# Patient Record
Sex: FEMALE | Race: ASIAN | NOT HISPANIC OR LATINO | Employment: UNEMPLOYED | RURAL
[De-identification: names, ages, dates, MRNs, and addresses within clinical notes are randomized per-mention and may not be internally consistent; named-entity substitution may affect disease eponyms.]

---

## 2023-04-04 ENCOUNTER — OFFICE VISIT (OUTPATIENT)
Dept: FAMILY MEDICINE CLINIC | Facility: CLINIC | Age: 53
End: 2023-04-04
Payer: MEDICAID

## 2023-04-04 VITALS
SYSTOLIC BLOOD PRESSURE: 124 MMHG | DIASTOLIC BLOOD PRESSURE: 80 MMHG | BODY MASS INDEX: 32.58 KG/M2 | HEART RATE: 73 BPM | OXYGEN SATURATION: 98 % | HEIGHT: 64 IN | WEIGHT: 190.8 LBS

## 2023-04-04 DIAGNOSIS — Z13.1 SCREENING FOR DIABETES MELLITUS: ICD-10-CM

## 2023-04-04 DIAGNOSIS — Z12.31 ENCOUNTER FOR SCREENING MAMMOGRAM FOR MALIGNANT NEOPLASM OF BREAST: ICD-10-CM

## 2023-04-04 DIAGNOSIS — Z11.59 ENCOUNTER FOR HEPATITIS C SCREENING TEST FOR LOW RISK PATIENT: ICD-10-CM

## 2023-04-04 DIAGNOSIS — G25.81 RESTLESS LEGS: ICD-10-CM

## 2023-04-04 DIAGNOSIS — F33.1 MAJOR DEPRESSIVE DISORDER, RECURRENT EPISODE, MODERATE DEGREE: ICD-10-CM

## 2023-04-04 DIAGNOSIS — Z13.220 SCREENING FOR HYPERLIPIDEMIA: ICD-10-CM

## 2023-04-04 DIAGNOSIS — E03.9 HYPOTHYROIDISM, UNSPECIFIED TYPE: Primary | ICD-10-CM

## 2023-04-04 DIAGNOSIS — F51.01 PRIMARY INSOMNIA: ICD-10-CM

## 2023-04-04 PROCEDURE — 99204 OFFICE O/P NEW MOD 45 MIN: CPT | Performed by: INTERNAL MEDICINE

## 2023-04-04 PROCEDURE — 1160F RVW MEDS BY RX/DR IN RCRD: CPT | Performed by: INTERNAL MEDICINE

## 2023-04-04 PROCEDURE — 36415 COLL VENOUS BLD VENIPUNCTURE: CPT | Performed by: INTERNAL MEDICINE

## 2023-04-04 PROCEDURE — 1159F MED LIST DOCD IN RCRD: CPT | Performed by: INTERNAL MEDICINE

## 2023-04-04 RX ORDER — SERTRALINE HYDROCHLORIDE 100 MG/1
50 TABLET, FILM COATED ORAL TAKE AS DIRECTED
COMMUNITY
End: 2023-04-04 | Stop reason: SDUPTHER

## 2023-04-04 RX ORDER — CLONAZEPAM 0.5 MG/1
0.25 TABLET ORAL DAILY
COMMUNITY
End: 2023-04-04

## 2023-04-04 RX ORDER — LEVOTHYROXINE SODIUM 0.12 MG/1
125 TABLET ORAL
COMMUNITY
End: 2023-04-04

## 2023-04-04 RX ORDER — LEVOTHYROXINE SODIUM 0.1 MG/1
100 TABLET ORAL
COMMUNITY
End: 2023-04-04

## 2023-04-04 RX ORDER — SERTRALINE HYDROCHLORIDE 100 MG/1
50 TABLET, FILM COATED ORAL TAKE AS DIRECTED
Qty: 45 TABLET | Refills: 3 | Status: SHIPPED | OUTPATIENT
Start: 2023-04-04

## 2023-04-04 RX ORDER — LEVOTHYROXINE SODIUM 112 UG/1
112 TABLET ORAL DAILY
Qty: 90 TABLET | Refills: 3 | Status: SHIPPED | OUTPATIENT
Start: 2023-04-04

## 2023-04-04 RX ORDER — TRAZODONE HYDROCHLORIDE 50 MG/1
50 TABLET ORAL NIGHTLY
Qty: 30 TABLET | Refills: 6 | Status: SHIPPED | OUTPATIENT
Start: 2023-04-04

## 2023-04-04 RX ORDER — DESVENLAFAXINE 50 MG/1
50 TABLET, EXTENDED RELEASE ORAL DAILY
Qty: 90 TABLET | Refills: 3 | Status: SHIPPED | OUTPATIENT
Start: 2023-04-04

## 2023-04-04 RX ORDER — DESVENLAFAXINE 100 MG/1
50 TABLET, EXTENDED RELEASE ORAL DAILY
COMMUNITY
End: 2023-04-04

## 2023-04-04 NOTE — PROGRESS NOTES
Office Note     Name: Anita Montano    : 1970     MRN: [id number]     Chief Complaint  Establish Care (Pt is here to establish care today. She is here with son Miles. ), Back Pain (C/o low back pain. ), and Insomnia (Pt complains of insomnia. )    Subjective     History of Present Illness:  Anita Montano is a 52 y.o. female who presents today for Establish Care, recentl moved from [facility]      Hypothyroidism diagnosed about 15 years ago, last labs were done in September    Depression:  Has been on meds for about 1.5 years    Insomnia: was started on clonazepam for sleep around that time,  Does not help much for sleep, feels tired all the time but cannot slept, intitially it worked well and no longer works as well.  Has never tried any other medications for sleep.      Has had lower back pain for one month , no history of injury but she did have some falls a few months ago. Does not recall why or how she fell. Denies numbness or tingling      Is fasting for labs today    Has never had mammogram.    PAP smear was     Had Hysterectomy prior to that. No history of cancer or tumors.    Colonoscopy: None    Review of Systems:   Review of Systems    Past Medical History:   Past Medical History:   Diagnosis Date   • Anxiety    • Depression    • Hypothyroidism        Past Surgical History:   Past Surgical History:   Procedure Laterality Date   • HYSTERECTOMY      Non-cancerous reasons       Family History:   Family History   Problem Relation Age of Onset   • Breast cancer Mother    • Hypertension Mother    • Asthma Father    • Breast cancer Sister    • Cancer Brother        Social History:   Social History     Socioeconomic History   • Marital status:    Tobacco Use   • Smoking status: Never     Passive exposure: Never   • Smokeless tobacco: Never   Vaping Use   • Vaping Use: Never used   Substance and Sexual Activity   • Alcohol use: Not Currently   • Drug use: Defer   • Sexual  "activity: Defer       Immunizations:   There is no immunization history on file for this patient.     Medications:     Current Outpatient Medications:   •  sertraline (ZOLOFT) 100 MG tablet, Take 0.5 tablets by mouth Take As Directed. Take Half Pill 1 qd, Disp: 45 tablet, Rfl: 3  •  desvenlafaxine ER (KHEDEZLA) 100 MG tablet sustained-release 24 hour 24 hr tablet, Take 1/2 tablet by mouth Daily., Disp: 90 tablet, Rfl: 3  •  levothyroxine (SYNTHROID, LEVOTHROID) 112 MCG tablet, Take 1 tablet by mouth Daily., Disp: 90 tablet, Rfl: 3  •  traZODone (DESYREL) 50 MG tablet, Take 1 tablet by mouth Every Night., Disp: 30 tablet, Rfl: 6    Allergies:   No Known Allergies    Objective     Vital Signs  /80   Pulse 73   Ht 162.6 cm (64\")   Wt 86.5 kg (190 lb 12.8 oz)   SpO2 98%   BMI 32.75 kg/m²   Estimated body mass index is 32.75 kg/m² as calculated from the following:    Height as of this encounter: 162.6 cm (64\").    Weight as of this encounter: 86.5 kg (190 lb 12.8 oz).          Physical Exam  Vitals and nursing note reviewed.   Constitutional:       Appearance: Normal appearance.   Cardiovascular:      Rate and Rhythm: Normal rate and regular rhythm.      Heart sounds: No murmur heard.    No friction rub. No gallop.   Pulmonary:      Effort: Pulmonary effort is normal.      Breath sounds: Normal breath sounds. No wheezing, rhonchi or rales.   Musculoskeletal:      Thoracic back: No bony tenderness.      Lumbar back: No bony tenderness.   Neurological:      Mental Status: She is alert.            Procedures     Assessment and Plan     1. Hypothyroidism, unspecified type    - Comprehensive Metabolic Panel; Future  - TSH Rfx On Abnormal To Free T4; Future  - Comprehensive Metabolic Panel  - TSH Rfx On Abnormal To Free T4  - levothyroxine (SYNTHROID, LEVOTHROID) 112 MCG tablet; Take 1 tablet by mouth Daily.  Dispense: 90 tablet; Refill: 3    2. Major depressive disorder, recurrent episode, moderate degree    - CBC " Auto Differential; Future  - Comprehensive Metabolic Panel; Future  - CBC Auto Differential  - Comprehensive Metabolic Panel  - sertraline (ZOLOFT) 100 MG tablet; Take 0.5 tablets by mouth Take As Directed. Take Half Pill 1 qd  Dispense: 45 tablet; Refill: 3  - desvenlafaxine ER (KHEDEZLA) 50 MG tablet sustained-release 24 hour 24 hr tablet; Take 1 tablet by mouth Daily.  Dispense: 90 tablet; Refill: 3    3. Screening for hyperlipidemia    - Lipid Panel; Future  - Lipid Panel    4. Encounter for hepatitis C screening test for low risk patient      5. Screening for diabetes mellitus    - Comprehensive Metabolic Panel; Future  - Hepatitis C Antibody; Future  - Comprehensive Metabolic Panel  - Hepatitis C Antibody    6. Restless legs    - CBC Auto Differential; Future  - Comprehensive Metabolic Panel; Future  - Iron and TIBC; Future  - CBC Auto Differential  - Comprehensive Metabolic Panel  - Iron and TIBC    7. Primary insomnia    - traZODone (DESYREL) 50 MG tablet; Take 1 tablet by mouth Every Night.  Dispense: 30 tablet; Refill: 6    8. Encounter for screening mammogram for malignant neoplasm of breast    - Mammo Screening Bilateral With CAD; Future       Follow Up  Return in about 3 months (around 7/4/2023) for Followup with Dr Quevedo- IN PERSON.    Patient was given instructions and counseling regarding her condition or for health maintenance advice. Please see specific information pulled into the AVS if appropriate.     Maida Quevedo MD  [facility]  [address] [place] [id number] [phone]

## 2023-04-05 LAB
ALBUMIN SERPL-MCNC: 4.3 G/DL (ref 3.8–4.9)
ALBUMIN/GLOB SERPL: 1.7 {RATIO} (ref 1.2–2.2)
ALP SERPL-CCNC: 85 IU/L (ref 44–121)
ALT SERPL-CCNC: 19 IU/L (ref 0–32)
AST SERPL-CCNC: 21 IU/L (ref 0–40)
BASOPHILS # BLD AUTO: 0 X10E3/UL (ref 0–0.2)
BASOPHILS NFR BLD AUTO: 1 %
BILIRUB SERPL-MCNC: 0.3 MG/DL (ref 0–1.2)
BUN SERPL-MCNC: 9 MG/DL (ref 6–24)
BUN/CREAT SERPL: 12 (ref 9–23)
CALCIUM SERPL-MCNC: 9.4 MG/DL (ref 8.7–10.2)
CHLORIDE SERPL-SCNC: 106 MMOL/L (ref 96–106)
CHOLEST SERPL-MCNC: 226 MG/DL (ref 100–199)
CO2 SERPL-SCNC: 25 MMOL/L (ref 20–29)
CREAT SERPL-MCNC: 0.77 MG/DL (ref 0.57–1)
EGFRCR SERPLBLD CKD-EPI 2021: 92 ML/MIN/1.73
EOSINOPHIL # BLD AUTO: 0.1 X10E3/UL (ref 0–0.4)
EOSINOPHIL NFR BLD AUTO: 2 %
ERYTHROCYTE [DISTWIDTH] IN BLOOD BY AUTOMATED COUNT: 14.6 % (ref 11.7–15.4)
GLOBULIN SER CALC-MCNC: 2.5 G/DL (ref 1.5–4.5)
GLUCOSE SERPL-MCNC: 95 MG/DL (ref 70–99)
HCT VFR BLD AUTO: 38.9 % (ref 34–46.6)
HCV IGG SERPL QL IA: NON REACTIVE
HDLC SERPL-MCNC: 80 MG/DL
HGB BLD-MCNC: 13.1 G/DL (ref 11.1–15.9)
IMM GRANULOCYTES # BLD AUTO: 0 X10E3/UL (ref 0–0.1)
IMM GRANULOCYTES NFR BLD AUTO: 0 %
IRON SATN MFR SERPL: 22 % (ref 15–55)
IRON SERPL-MCNC: 79 UG/DL (ref 27–159)
LDLC SERPL CALC-MCNC: 125 MG/DL (ref 0–99)
LYMPHOCYTES # BLD AUTO: 2.1 X10E3/UL (ref 0.7–3.1)
LYMPHOCYTES NFR BLD AUTO: 36 %
MCH RBC QN AUTO: 26.7 PG (ref 26.6–33)
MCHC RBC AUTO-ENTMCNC: 33.7 G/DL (ref 31.5–35.7)
MCV RBC AUTO: 79 FL (ref 79–97)
MONOCYTES # BLD AUTO: 0.4 X10E3/UL (ref 0.1–0.9)
MONOCYTES NFR BLD AUTO: 7 %
NEUTROPHILS # BLD AUTO: 3.3 X10E3/UL (ref 1.4–7)
NEUTROPHILS NFR BLD AUTO: 54 %
PLATELET # BLD AUTO: 286 X10E3/UL (ref 150–450)
POTASSIUM SERPL-SCNC: 4.5 MMOL/L (ref 3.5–5.2)
PROT SERPL-MCNC: 6.8 G/DL (ref 6–8.5)
RBC # BLD AUTO: 4.9 X10E6/UL (ref 3.77–5.28)
SODIUM SERPL-SCNC: 143 MMOL/L (ref 134–144)
TIBC SERPL-MCNC: 363 UG/DL (ref 250–450)
TRIGL SERPL-MCNC: 119 MG/DL (ref 0–149)
TSH SERPL DL<=0.005 MIU/L-ACNC: 0.69 UIU/ML (ref 0.45–4.5)
UIBC SERPL-MCNC: 284 UG/DL (ref 131–425)
VLDLC SERPL CALC-MCNC: 21 MG/DL (ref 5–40)
WBC # BLD AUTO: 6 X10E3/UL (ref 3.4–10.8)

## 2023-04-11 ENCOUNTER — OFFICE VISIT (OUTPATIENT)
Dept: FAMILY MEDICINE CLINIC | Facility: CLINIC | Age: 53
End: 2023-04-11
Payer: MEDICAID

## 2023-04-11 VITALS
HEIGHT: 63 IN | OXYGEN SATURATION: 98 % | TEMPERATURE: 98.1 F | DIASTOLIC BLOOD PRESSURE: 84 MMHG | HEART RATE: 77 BPM | BODY MASS INDEX: 33.66 KG/M2 | SYSTOLIC BLOOD PRESSURE: 126 MMHG | WEIGHT: 190 LBS

## 2023-04-11 DIAGNOSIS — M54.2 NECK PAIN: ICD-10-CM

## 2023-04-11 DIAGNOSIS — M54.50 CHRONIC LOW BACK PAIN WITHOUT SCIATICA, UNSPECIFIED BACK PAIN LATERALITY: Primary | ICD-10-CM

## 2023-04-11 DIAGNOSIS — M25.511 ACUTE PAIN OF RIGHT SHOULDER: ICD-10-CM

## 2023-04-11 DIAGNOSIS — G89.29 CHRONIC LOW BACK PAIN WITHOUT SCIATICA, UNSPECIFIED BACK PAIN LATERALITY: Primary | ICD-10-CM

## 2023-04-11 PROCEDURE — 1159F MED LIST DOCD IN RCRD: CPT | Performed by: INTERNAL MEDICINE

## 2023-04-11 PROCEDURE — 99213 OFFICE O/P EST LOW 20 MIN: CPT | Performed by: INTERNAL MEDICINE

## 2023-04-11 PROCEDURE — 1160F RVW MEDS BY RX/DR IN RCRD: CPT | Performed by: INTERNAL MEDICINE

## 2023-04-11 RX ORDER — CYCLOBENZAPRINE HCL 5 MG
5 TABLET ORAL 2 TIMES DAILY PRN
Qty: 14 TABLET | Refills: 0 | Status: SHIPPED | OUTPATIENT
Start: 2023-04-11

## 2023-04-11 NOTE — PROGRESS NOTES
Office Note     Name: Anita Montano    : 1970     MRN: [id number]     Chief Complaint  Facial Pain and Arm Pain (C/o swelling in the right arm goes all the way down in the hand onset 15 days. )    Subjective     History of Present Illness:  Antia Montano is a 53 y.o. female who presents today for pain and swelling in right arm    Has pain along rgiht side of face radiating to shoulder arm and fingers on the right.     Hast noticed the beins I nthe right side of the arm seem much more prominent than usual, hurts to squeeze or grab o nthat side but the arm and face have not been swollen or discolored    Review of Systems:   Review of Systems    Past Medical History:   Past Medical History:   Diagnosis Date   • Anxiety    • Depression    • Hypothyroidism        Past Surgical History:   Past Surgical History:   Procedure Laterality Date   • HYSTERECTOMY  2010    Non-cancerous reasons       Family History:   Family History   Problem Relation Age of Onset   • Breast cancer Mother    • Hypertension Mother    • Asthma Father    • Breast cancer Sister    • Cancer Brother        Social History:   Social History     Socioeconomic History   • Marital status:    Tobacco Use   • Smoking status: Never     Passive exposure: Never   • Smokeless tobacco: Never   Vaping Use   • Vaping Use: Never used   Substance and Sexual Activity   • Alcohol use: Not Currently   • Drug use: Defer   • Sexual activity: Defer       Immunizations:   There is no immunization history on file for this patient.     Medications:     Current Outpatient Medications:   •  desvenlafaxine ER (KHEDEZLA) 50 MG tablet sustained-release 24 hour 24 hr tablet, Take 1 tablet by mouth Daily., Disp: 90 tablet, Rfl: 3  •  levothyroxine (SYNTHROID, LEVOTHROID) 112 MCG tablet, Take 1 tablet by mouth Daily., Disp: 90 tablet, Rfl: 3  •  sertraline (ZOLOFT) 100 MG tablet, Take 0.5 tablets by mouth Take As Directed. Take Half Pill 1 qd, Disp: 45  "tablet, Rfl: 3  •  traZODone (DESYREL) 50 MG tablet, Take 1 tablet by mouth Every Night., Disp: 30 tablet, Rfl: 6    Allergies:   No Known Allergies    Objective     Vital Signs  /84   Pulse 77   Temp 98.1 °F (36.7 °C)   Ht 160 cm (63\")   Wt 86.2 kg (190 lb)   SpO2 98%   BMI 33.66 kg/m²   Estimated body mass index is 33.66 kg/m² as calculated from the following:    Height as of this encounter: 160 cm (63\").    Weight as of this encounter: 86.2 kg (190 lb).          Physical Exam  Vitals and nursing note reviewed.   Constitutional:       Appearance: Normal appearance.   HENT:      Right Ear: Tympanic membrane normal.      Left Ear: Tympanic membrane normal.   Cardiovascular:      Rate and Rhythm: Normal rate and regular rhythm.      Heart sounds: No murmur heard.    No friction rub. No gallop.   Pulmonary:      Effort: Pulmonary effort is normal.      Breath sounds: Normal breath sounds. No wheezing, rhonchi or rales.   Musculoskeletal:      Cervical back: No deformity or bony tenderness.      Comments: No pitting edema, 5/5 strength of bilateral upper extremities upon flexion and extension at shoulders, elbows and wrists, normal abduction, flexion and extension, mild muscle sparms of right shoulder   Neurological:      Mental Status: She is alert.            Procedures     Assessment and Plan     1. Chronic low back pain without sciatica, unspecified back pain laterality    - XR Spine Cervical 2 or 3 View; Future  - XR Spine Lumbar 2 or 3 View (In Office)  - cyclobenzaprine (FLEXERIL) 5 MG tablet; Take 1 tablet by mouth 2 (Two) Times a Day As Needed for Muscle Spasms.  Dispense: 14 tablet; Refill: 0  - XR Spine Thoracic 2 View    2. Neck pain    - XR Spine Cervical 2 or 3 View; Future  - XR Spine Lumbar 2 or 3 View (In Office)  - cyclobenzaprine (FLEXERIL) 5 MG tablet; Take 1 tablet by mouth 2 (Two) Times a Day As Needed for Muscle Spasms.  Dispense: 14 tablet; Refill: 0  - XR Spine Thoracic 2 View    3. " Acute pain of right shoulder    - XR Spine Cervical 2 or 3 View; Future  - XR Spine Lumbar 2 or 3 View (In Office)  - cyclobenzaprine (FLEXERIL) 5 MG tablet; Take 1 tablet by mouth 2 (Two) Times a Day As Needed for Muscle Spasms.  Dispense: 14 tablet; Refill: 0  - XR Spine Thoracic 2 View    .dig    Follow Up  Return if symptoms worsen or fail to improve.    Patient was given instructions and counseling regarding her condition or for health maintenance advice. Please see specific information pulled into the AVS if appropriate.     MD ISAC [facility]  [address] [facility] [id number] [phone]

## 2023-04-24 ENCOUNTER — OFFICE VISIT (OUTPATIENT)
Dept: FAMILY MEDICINE CLINIC | Facility: CLINIC | Age: 53
End: 2023-04-24
Payer: MEDICAID

## 2023-04-24 VITALS
SYSTOLIC BLOOD PRESSURE: 132 MMHG | TEMPERATURE: 98.1 F | HEART RATE: 93 BPM | DIASTOLIC BLOOD PRESSURE: 84 MMHG | OXYGEN SATURATION: 99 % | BODY MASS INDEX: 33.66 KG/M2 | WEIGHT: 190 LBS

## 2023-04-24 DIAGNOSIS — M25.462 EFFUSION OF BOTH KNEE JOINTS: ICD-10-CM

## 2023-04-24 DIAGNOSIS — M50.30 DEGENERATIVE DISC DISEASE, CERVICAL: ICD-10-CM

## 2023-04-24 DIAGNOSIS — M25.60 JOINT STIFFNESS: ICD-10-CM

## 2023-04-24 DIAGNOSIS — M25.461 EFFUSION OF BOTH KNEE JOINTS: ICD-10-CM

## 2023-04-24 DIAGNOSIS — M51.36 DEGENERATIVE DISC DISEASE, LUMBAR: Primary | ICD-10-CM

## 2023-04-24 DIAGNOSIS — M15.9 PRIMARY OSTEOARTHRITIS INVOLVING MULTIPLE JOINTS: ICD-10-CM

## 2023-04-25 LAB
ANA SER QL: NEGATIVE
CRP SERPL-MCNC: 3 MG/L (ref 0–10)
ERYTHROCYTE [SEDIMENTATION RATE] IN BLOOD BY WESTERGREN METHOD: 18 MM/HR (ref 0–40)
RHEUMATOID FACT SERPL-ACNC: <10 IU/ML
URATE SERPL-MCNC: 4.7 MG/DL (ref 3–7.2)

## 2023-04-26 NOTE — PROGRESS NOTES
Office Note     Name: Anita Montano    : 1970     MRN: [id number]     Chief Complaint  Pain (Pt complains of joint pain today. ) and Earache (C/o right ear ache and facial discomfort. She is here with her . )     Offered professional  translation/ services for this visit but patient and her  declined and opted to have the  provide most of the medical history for this visit    Subjective     History of Present Illness:  Anita Montano is a 53 y.o. female who presents today for worsening shoulder and knee pain.    At last visit did a trial of flexeril to help with shoulder neck and ear pain due to noted muscle rightness in the right sholder but it did not help.     also describes worsening knee pain and swelling over the last several months.  Knees are more stiff and painful,n ever red but noticeably more swollen        Review of Systems:   Review of Systems    Past Medical History:   Past Medical History:   Diagnosis Date   • Anxiety    • Depression    • Hypothyroidism        Past Surgical History:   Past Surgical History:   Procedure Laterality Date   • HYSTERECTOMY  2010    Non-cancerous reasons       Family History:   Family History   Problem Relation Age of Onset   • Breast cancer Mother    • Hypertension Mother    • Asthma Father    • Breast cancer Sister    • Cancer Brother        Social History:   Social History     Socioeconomic History   • Marital status:    Tobacco Use   • Smoking status: Never     Passive exposure: Never   • Smokeless tobacco: Never   Vaping Use   • Vaping Use: Never used   Substance and Sexual Activity   • Alcohol use: Not Currently   • Drug use: Defer   • Sexual activity: Defer       Immunizations:   There is no immunization history on file for this patient.     Medications:     Current Outpatient Medications:   •  cyclobenzaprine (FLEXERIL) 5 MG tablet, Take 1 tablet by mouth 2 (Two) Times a Day As Needed for Muscle  "Spasms., Disp: 14 tablet, Rfl: 0  •  desvenlafaxine ER (KHEDEZLA) 50 MG tablet sustained-release 24 hour 24 hr tablet, Take 1 tablet by mouth Daily., Disp: 90 tablet, Rfl: 3  •  levothyroxine (SYNTHROID, LEVOTHROID) 112 MCG tablet, Take 1 tablet by mouth Daily., Disp: 90 tablet, Rfl: 3  •  sertraline (ZOLOFT) 100 MG tablet, Take 0.5 tablets by mouth Take As Directed. Take Half Pill 1 qd, Disp: 45 tablet, Rfl: 3  •  traZODone (DESYREL) 50 MG tablet, Take 1 tablet by mouth Every Night., Disp: 30 tablet, Rfl: 6  1    Allergies:   No Known Allergies    Objective     Vital Signs  /84   Pulse 93   Temp 98.1 °F (36.7 °C) (Oral)   Wt 86.2 kg (190 lb)   SpO2 99%   BMI 33.66 kg/m²   Estimated body mass index is 33.66 kg/m² as calculated from the following:    Height as of 4/11/23: 160 cm (63\").    Weight as of this encounter: 86.2 kg (190 lb).          Physical Exam  Vitals and nursing note reviewed.   Constitutional:       General: She is not in acute distress.     Appearance: Normal appearance.   Musculoskeletal:      Right shoulder: Tenderness present. Normal range of motion.        Arms:       Right knee: Swelling and effusion present. No erythema. Normal range of motion. No ACL laxity or PCL laxity.      Left knee: Swelling and effusion present. No erythema. Normal range of motion. No ACL laxity or PCL laxity.     Comments: Muscle tightness   Neurological:      Mental Status: She is alert.          Procedures     Assessment and Plan     1. Degenerative disc disease, lumbar  - diclofenac (VOLTAREN) 50 MG EC tablet; Take 1 tablet by mouth 2 (Two) Times a Day As Needed (pain).  Dispense: 60 tablet; Refill: 1  - Ambulatory Referral to Orthopedic Surgery    2. Degenerative disc disease, cervical  - diclofenac (VOLTAREN) 50 MG EC tablet; Take 1 tablet by mouth 2 (Two) Times a Day As Needed (pain).  Dispense: 60 tablet; Refill: 1    3. Primary osteoarthritis involving multiple joints  - diclofenac (VOLTAREN) 50 MG EC " tablet; Take 1 tablet by mouth 2 (Two) Times a Day As Needed (pain).  Dispense: 60 tablet; Refill: 1    4. Effusion of both knee joints  - diclofenac (VOLTAREN) 50 MG EC tablet; Take 1 tablet by mouth 2 (Two) Times a Day As Needed (pain).  Dispense: 60 tablet; Refill: 1  - Rheumatoid Factor, Quant; Future  - Uric acid; Future  - C-reactive protein; Future  - Sedimentation Rate; Future  - JAZMIN; Future  - Ambulatory Referral to Orthopedic Surgery  - Rheumatoid Factor, Quant  - Uric acid  - C-reactive protein  - Sedimentation Rate  - JAZMIN    5. Joint stiffness  - diclofenac (VOLTAREN) 50 MG EC tablet; Take 1 tablet by mouth 2 (Two) Times a Day As Needed (pain).  Dispense: 60 tablet; Refill: 1  - Rheumatoid Factor, Quant; Future  - Uric acid; Future  - C-reactive protein; Future  - Sedimentation Rate; Future  - JAZMIN; Future  - Rheumatoid Factor, Quant  - Uric acid  - C-reactive protein  - Sedimentation Rate  - JAZMIN     Offered professional  translation/ services for this visit but patient and her  declined and opted to have the  provide most of the medical history for this visit    Follow Up  No follow-ups on file.    Patient was given instructions and counseling regarding her condition or for health maintenance advice. Please see specific information pulled into the AVS if appropriate.     MD SELENE [facility]  [address] [place] [id number] [phone]

## 2023-05-01 ENCOUNTER — TELEPHONE (OUTPATIENT)
Dept: ORTHOPEDIC SURGERY | Facility: CLINIC | Age: 53
End: 2023-05-01

## 2023-05-01 NOTE — TELEPHONE ENCOUNTER
Caller: Anita Montano    Relationship to patient: SELF     Best call back number: [phone]    Chief complaint:  BILATERAL KNEE    Type of visit: NEW PT    Additional notes: PT NEEDS Vianey  FOR 5-15-23 APPT

## 2023-05-15 ENCOUNTER — OFFICE VISIT (OUTPATIENT)
Dept: ORTHOPEDIC SURGERY | Facility: CLINIC | Age: 53
End: 2023-05-15
Payer: MEDICAID

## 2023-05-15 VITALS
HEIGHT: 64 IN | WEIGHT: 188.8 LBS | SYSTOLIC BLOOD PRESSURE: 122 MMHG | BODY MASS INDEX: 32.23 KG/M2 | DIASTOLIC BLOOD PRESSURE: 76 MMHG

## 2023-05-15 DIAGNOSIS — M25.511 RIGHT SHOULDER PAIN, UNSPECIFIED CHRONICITY: Primary | ICD-10-CM

## 2023-05-15 PROCEDURE — 99203 OFFICE O/P NEW LOW 30 MIN: CPT | Performed by: ORTHOPAEDIC SURGERY

## 2023-05-15 PROCEDURE — 1159F MED LIST DOCD IN RCRD: CPT | Performed by: ORTHOPAEDIC SURGERY

## 2023-05-15 PROCEDURE — 1160F RVW MEDS BY RX/DR IN RCRD: CPT | Performed by: ORTHOPAEDIC SURGERY

## 2023-05-15 RX ORDER — DESVENLAFAXINE SUCCINATE 50 MG/1
1 TABLET, EXTENDED RELEASE ORAL DAILY
COMMUNITY
Start: 2023-04-05 | End: 2023-05-15

## 2023-05-15 NOTE — PROGRESS NOTES
[facility]opa[facility] Surgery Clinic Note        Subjective     Pain and Initial Evaluation of the Right Shoulder      HPI    Anita Montano is a 53 y.o. female.  She complains of neck pain radiating down to the right shoulder down to the right thumb.  She had it for a month.  She has tried diclofenac.    Past Medical History:   Diagnosis Date   • Anxiety    • Depression    • Hypothyroidism       Past Surgical History:   Procedure Laterality Date   • HYSTERECTOMY  2010    Non-cancerous reasons      Family History   Problem Relation Age of Onset   • Breast cancer Mother    • Hypertension Mother    • Asthma Father    • Breast cancer Sister    • Cancer Brother      Social History     Socioeconomic History   • Marital status:    Tobacco Use   • Smoking status: Never     Passive exposure: Never   • Smokeless tobacco: Never   Vaping Use   • Vaping Use: Never used   Substance and Sexual Activity   • Alcohol use: Not Currently   • Drug use: Defer   • Sexual activity: Defer      Current Outpatient Medications on File Prior to Visit   Medication Sig Dispense Refill   • cyclobenzaprine (FLEXERIL) 5 MG tablet Take 1 tablet by mouth 2 (Two) Times a Day As Needed for Muscle Spasms. 14 tablet 0   • desvenlafaxine ER (KHEDEZLA) 50 MG tablet sustained-release 24 hour 24 hr tablet Take 1 tablet by mouth Daily. 90 tablet 3   • diclofenac (VOLTAREN) 50 MG EC tablet Take 1 tablet by mouth 2 (Two) Times a Day As Needed (pain). 60 tablet 1   • levothyroxine (SYNTHROID, LEVOTHROID) 112 MCG tablet Take 1 tablet by mouth Daily. 90 tablet 3   • sertraline (ZOLOFT) 100 MG tablet Take 0.5 tablets by mouth Take As Directed. Take Half Pill 1 qd 45 tablet 3   • traZODone (DESYREL) 50 MG tablet Take 1 tablet by mouth Every Night. 30 tablet 6   • [DISCONTINUED] desvenlafaxine (PRISTIQ) 50 MG 24 hr tablet Take 1 tablet by mouth Daily.       No current facility-administered medications on file prior to visit.      No Known Allergies  "      Review of Systems   Constitutional: Negative.    HENT: Negative.    Eyes: Negative.    Respiratory: Negative.    Cardiovascular: Negative.    Gastrointestinal: Negative.    Endocrine: Negative.    Genitourinary: Negative.    Musculoskeletal: Positive for arthralgias.   Skin: Negative.    Allergic/Immunologic: Negative.    Neurological: Negative.    Hematological: Negative.    Psychiatric/Behavioral: Negative.         I reviewed the patient's chief complaint, history of present illness, review of systems, past medical history, surgical history, family history, social history, medications and allergy list.        Objective      Physical Exam  /76   Ht 161.3 cm (63.5\")   Wt 85.6 kg (188 lb 12.8 oz)   BMI 32.92 kg/m²     Body mass index is 32.92 kg/m².    General  Mental Status - alert  General Appearance - cooperative, well groomed, not in acute distress  Orientation - Oriented X3  Build & Nutrition - well developed and well nourished  Posture - normal posture  Gait - normal gait       Ortho Exam  Right shoulder full motion full-strength with positive Spurling's.  Mildly positive impingement.    Imaging/Studies  Imaging Results (Last 24 Hours)     Procedure Component Value Units Date/Time    XR Shoulder 2+ View Right [[id number]] Resulted: 05/15/23 1319     Updated: 05/15/23 1319    Narrative:      Right Shoulder X-Ray  Indication: Pain  AP, scapular Y, and axillary lateral views    Findings:  No fracture  No bony lesion  Normal soft tissues  Normal joint spaces    No prior studies were available for comparison.          I viewed her cervical x-rays from April which showed degenerative disc disease at C4-5 especially    Assessment    Assessment:  1. Right shoulder pain, unspecified chronicity        Plan:  Continue over-the-counter medication as needed for discomfort  She has cervical radiculopathy.  Ordered physical therapy.  She will do that in [place] and follow-up in 1 month.  I offered her " cortisone injection for the shoulder and referral to a possible specialist for neck injection but she does not want any shots yet        Kyrie Romero MD  05/15/23  13:26 EDT      Dictated Utilizing Dragon Dictation.

## 2023-05-22 ENCOUNTER — TELEPHONE (OUTPATIENT)
Dept: FAMILY MEDICINE CLINIC | Facility: CLINIC | Age: 53
End: 2023-05-22
Payer: MEDICAID

## 2023-05-22 DIAGNOSIS — R92.8 ABNORMAL MAMMOGRAM OF BOTH BREASTS: Primary | ICD-10-CM

## 2023-05-22 NOTE — TELEPHONE ENCOUNTER
Pt  is asking for a call about his wife next step from a mammogram. He said she had the mammogram done in [place]. He is asking what to do for a next step for a mammogram. Says son dropped off letter, Marci took it back there.

## 2023-05-23 NOTE — TELEPHONE ENCOUNTER
I have placed the additional orders but I would like patient to keep appt tomorrow to discuss in detail. We can do the additional mamogram and US at [facility], but in the future I strongly encourage her to get her mammograms done only through [facility] because [facility] has been very difficult to work with in terms of getting tests scheduled and getting results back in a timely manner

## 2023-05-23 NOTE — TELEPHONE ENCOUNTER
No answer and no voicemail. Dr Quevedo said that we need additional views from mammogram such as ultra sound. We are very sorry  for not contacting her about the mammogram we actually had to call and get the report faxed because they still had not sent this over.     We will get that set up and contact them wife info.     Also it looks like she has appt tomorrow I am sending to Dr Quevedo so that she can put her orders in!

## 2023-05-24 ENCOUNTER — OFFICE VISIT (OUTPATIENT)
Dept: FAMILY MEDICINE CLINIC | Facility: CLINIC | Age: 53
End: 2023-05-24
Payer: MEDICAID

## 2023-05-24 VITALS
SYSTOLIC BLOOD PRESSURE: 122 MMHG | DIASTOLIC BLOOD PRESSURE: 82 MMHG | WEIGHT: 193.8 LBS | OXYGEN SATURATION: 98 % | HEART RATE: 89 BPM | HEIGHT: 63 IN | BODY MASS INDEX: 34.34 KG/M2

## 2023-05-24 DIAGNOSIS — R92.8 ABNORMAL MAMMOGRAM OF BOTH BREASTS: Primary | ICD-10-CM

## 2023-05-24 PROCEDURE — 1159F MED LIST DOCD IN RCRD: CPT | Performed by: INTERNAL MEDICINE

## 2023-05-24 PROCEDURE — 99213 OFFICE O/P EST LOW 20 MIN: CPT | Performed by: INTERNAL MEDICINE

## 2023-05-24 PROCEDURE — 1160F RVW MEDS BY RX/DR IN RCRD: CPT | Performed by: INTERNAL MEDICINE

## 2023-05-24 NOTE — PROGRESS NOTES
Office Note     Name: Anita Montano    : 1970     MRN: [id number]     Chief Complaint  discuss mammogram  (Pt is here to discuss mammogram results. She is here with son. )    Subjective     History of Present Illness:  Anita Montano is a 53 y.o. female who presents today for follow-up on recent mammogram results.  Patient called the office earlier this week because they got a letter in the mail stating the mammogram results were abnormal.  However at that time [facility] still have not sent us any of the results we had to manually obtain them.      Results were as follows:  Right lower inner breast 4 cm from the nipple was an 8 mm focal asymmetry  Left lateral breast 2 cm from the nipple was a 5 mm focal asymmetry  Radiology recommended bilateral full ML in the CC/MLO spot compression views plus possible ultrasound, as well as possible breast MRI depending on her lifetime risk of breast cancer.      Age at first period: 12,   Age of first childbirth 20  2 relatives with breast cancer    Mom had unilateral breast cancer, unsure of age but she survived and is now 75   Sister had breast cancer at age 22-23, was discovered while pregnant and was unable to undergo treatments, did    not survive        Review of Systems:   Review of Systems    Past Medical History:   Past Medical History:   Diagnosis Date   • Anxiety    • Depression    • Hypothyroidism        Past Surgical History:   Past Surgical History:   Procedure Laterality Date   • HYSTERECTOMY  2010    Non-cancerous reasons       Family History:   Family History   Problem Relation Age of Onset   • Breast cancer Mother    • Hypertension Mother    • Asthma Father    • Breast cancer Sister    • Cancer Brother        Social History:   Social History     Socioeconomic History   • Marital status:    Tobacco Use   • Smoking status: Never     Passive exposure: Never   • Smokeless tobacco: Never   Vaping Use   • Vaping  "Use: Never used   Substance and Sexual Activity   • Alcohol use: Not Currently   • Drug use: Defer   • Sexual activity: Defer       Immunizations:   There is no immunization history on file for this patient.     Medications:     Current Outpatient Medications:   •  cyclobenzaprine (FLEXERIL) 5 MG tablet, Take 1 tablet by mouth 2 (Two) Times a Day As Needed for Muscle Spasms., Disp: 14 tablet, Rfl: 0  •  desvenlafaxine ER (KHEDEZLA) 50 MG tablet sustained-release 24 hour 24 hr tablet, Take 1 tablet by mouth Daily., Disp: 90 tablet, Rfl: 3  •  levothyroxine (SYNTHROID, LEVOTHROID) 112 MCG tablet, Take 1 tablet by mouth Daily., Disp: 90 tablet, Rfl: 3  •  sertraline (ZOLOFT) 100 MG tablet, Take 0.5 tablets by mouth Take As Directed. Take Half Pill 1 qd, Disp: 45 tablet, Rfl: 3  •  traZODone (DESYREL) 50 MG tablet, Take 1 tablet by mouth Every Night., Disp: 30 tablet, Rfl: 6    Allergies:   No Known Allergies    Objective     Vital Signs  /82   Pulse 89   Ht 160 cm (63\")   Wt 87.9 kg (193 lb 12.8 oz)   SpO2 98%   BMI 34.33 kg/m²   Estimated body mass index is 34.33 kg/m² as calculated from the following:    Height as of this encounter: 160 cm (63\").    Weight as of this encounter: 87.9 kg (193 lb 12.8 oz).          Physical Exam  Exam conducted with a chaperone present (Varsha Brink).   Chest:   Breasts:     Right: Tenderness present. No bleeding, inverted nipple, mass, nipple discharge or skin change.      Left: Skin change (single isolated imm dark pigmented skin dimple in the upper chest with no surrounding erythema) and tenderness present. No bleeding, inverted nipple, mass or nipple discharge.          Comments: Tender diffusely thoughout but no palpable mass.  Lymphadenopathy:      Upper Body:      Right upper body: No supraclavicular or axillary adenopathy.      Left upper body: No supraclavicular or axillary adenopathy.            Procedures     Assessment and Plan     1. Abnormal mammogram of both " breasts  Mammogram and US has been ordered.          Lilo Risk Assessment Calculator    The results below are based on the MAUREEN risk assessment model.   These results display your 10-year risk and your  lifetime risk compared to the [place] population average.     Personal 10-Year Risk 8.10 %  Population  10-Year Risk 3.00 %  Personal Lifetime Risk  21.2 %  Population Lifetime Risk 8.40 %    Various versions of the Shelby risk model put her lifetime risk but her Lifetime Risk between 5.5 and 16.1% depending on ethnicity  used    I spent 28 minutes caring for this patient on this date of service. This time includes time spent by me in the following activities: preparing for the visit, reviewing tests, obtaining and/or reviewing a separately obtained history, counseling and educating the patient/family/caregiver and documenting information in the medical record.    Follow Up  Return if symptoms worsen or fail to improve.    Patient was given instructions and counseling regarding her condition or for health maintenance advice. Please see specific information pulled into the AVS if appropriate.     MD S[facility]  [address] [place] [id number] [phone]

## 2023-05-31 ENCOUNTER — TELEPHONE (OUTPATIENT)
Dept: FAMILY MEDICINE CLINIC | Facility: CLINIC | Age: 53
End: 2023-05-31

## 2023-05-31 ENCOUNTER — PATIENT MESSAGE (OUTPATIENT)
Dept: FAMILY MEDICINE CLINIC | Facility: CLINIC | Age: 53
End: 2023-05-31

## 2023-05-31 NOTE — TELEPHONE ENCOUNTER
Caller: Miles Montano    Relationship: Emergency Contact    Best call back number: [phone]    What orders are you requesting (i.e. lab or imaging): MAMMOGRAM AND ULTRASOUND    In what timeframe would the patient need to come in: ASAP    Where will you receive your lab/imaging services: [facility]    Additional notes: CALLER IS REQUESTING FOR ORDERS TO BE PLACED    CALLER WOULD LIKE A CALL ABOUT THE ORDERS

## 2023-06-01 NOTE — TELEPHONE ENCOUNTER
Spoke with damon, [place] has been [place] [facility] Notified patient in her mychart message

## 2023-06-19 ENCOUNTER — OFFICE VISIT (OUTPATIENT)
Dept: ORTHOPEDIC SURGERY | Facility: CLINIC | Age: 53
End: 2023-06-19
Payer: MEDICAID

## 2023-06-19 VITALS
HEIGHT: 64 IN | SYSTOLIC BLOOD PRESSURE: 144 MMHG | WEIGHT: 190 LBS | BODY MASS INDEX: 32.44 KG/M2 | DIASTOLIC BLOOD PRESSURE: 90 MMHG

## 2023-06-19 DIAGNOSIS — M25.561 PAIN IN BOTH KNEES, UNSPECIFIED CHRONICITY: ICD-10-CM

## 2023-06-19 DIAGNOSIS — M54.12 CERVICAL RADICULOPATHY: ICD-10-CM

## 2023-06-19 DIAGNOSIS — M25.511 RIGHT SHOULDER PAIN, UNSPECIFIED CHRONICITY: Primary | ICD-10-CM

## 2023-06-19 DIAGNOSIS — M25.562 PAIN IN BOTH KNEES, UNSPECIFIED CHRONICITY: ICD-10-CM

## 2023-06-19 PROCEDURE — 99214 OFFICE O/P EST MOD 30 MIN: CPT | Performed by: ORTHOPAEDIC SURGERY

## 2023-06-19 RX ORDER — NAPROXEN 500 MG/1
500 TABLET ORAL 2 TIMES DAILY
Qty: 60 TABLET | Refills: 0 | Status: SHIPPED | OUTPATIENT
Start: 2023-06-19

## 2023-06-19 NOTE — PROGRESS NOTES
"    [facility] Note        Subjective     CC: Follow-up (1.)1 month follow up - Right shoulder pain/2.) New Problem Bilateral Knee Pain R>L)      RICKY Montano is a 53 y.o. female.  She is here today about bilateral knee pain.  She has pain in the neck radiating down the right shoulder to the right thumb.  She has had it for a month.  She did not go to physical therapy.  She was expecting a phone call from them.  She has an online  today.  She is here with her .  Communication is difficult.  ROS:    Constiutional:Pt denies fever, chills, nausea, or vomiting.  MSK:as above        Objective      Past Medical History  Past Medical History:   Diagnosis Date    Anxiety     Depression     Hypothyroidism      Social History     Socioeconomic History    Marital status:    Tobacco Use    Smoking status: Never     Passive exposure: Never    Smokeless tobacco: Never   Vaping Use    Vaping Use: Never used   Substance and Sexual Activity    Alcohol use: Not Currently    Drug use: Defer    Sexual activity: Defer          Physical Exam  /90   Ht 161.3 cm (63.5\")   Wt 86.2 kg (190 lb)   BMI 33.13 kg/m²     Body mass index is 33.13 kg/m².    Patient is well nourished and well developed.        Ortho Exam  No change in right shoulder exam.  Positive impingement.  Grossly motor sensor intact.  Tender trapezius.  Bilateral knees tender patella tendons.  Minimal medial joint tenderness.  Stable ligaments.  Normal gait    Imaging/Labs/EMG Reviewed:  Imaging Results (Last 24 Hours)       Procedure Component Value Units Date/Time    XR Knee 4+ View Bilateral [[id number]] Resulted: 06/19/23 1431     Updated: 06/19/23 1431    Narrative:      Bilateral Knee X-Rays  Indication: Pain    Upright AP, Lateral, skiers and Sunrise views of bilateral knees     Findings: Medial compartment joint space narrowing  No fracture  No bony lesion  Normal soft tissues  No prior studies were " available for comparison.              Assessment    Assessment:  1. Right shoulder pain, unspecified chronicity    2. Pain in both knees, unspecified chronicity    3. Cervical radiculopathy        Plan:  I ordered Naprosyn for her shoulder and neck and knees.  I have ordered physical therapy for shoulder and knees.  I will refer to spine surgery based upon her cervical radicular symptoms.  She does not want a cortisone injection      Kyrie Romero MD  06/19/23  14:36 EDT      Dictated Utilizing Dragon Dictation.

## 2023-08-12 ENCOUNTER — HOSPITAL ENCOUNTER (OUTPATIENT)
Dept: MRI IMAGING | Facility: HOSPITAL | Age: 53
Discharge: HOME | End: 2023-08-12
Admitting: PHYSICIAN ASSISTANT
Payer: MEDICAID

## 2023-08-12 DIAGNOSIS — M54.12 CERVICAL RADICULOPATHY: ICD-10-CM

## 2023-08-12 PROCEDURE — 72141 MRI NECK SPINE W/O DYE: CPT

## 2023-08-17 DIAGNOSIS — F51.01 PRIMARY INSOMNIA: ICD-10-CM

## 2023-08-17 DIAGNOSIS — F33.1 MAJOR DEPRESSIVE DISORDER, RECURRENT EPISODE, MODERATE DEGREE: ICD-10-CM

## 2023-08-18 ENCOUNTER — TELEPHONE (OUTPATIENT)
Dept: ORTHOPEDIC SURGERY | Facility: CLINIC | Age: 53
End: 2023-08-18
Payer: MEDICAID

## 2023-08-18 DIAGNOSIS — M54.12 CERVICAL RADICULOPATHY: ICD-10-CM

## 2023-08-18 DIAGNOSIS — M25.511 RIGHT SHOULDER PAIN, UNSPECIFIED CHRONICITY: Primary | ICD-10-CM

## 2023-08-18 RX ORDER — NAPROXEN 500 MG/1
500 TABLET ORAL 2 TIMES DAILY
Qty: 60 TABLET | Refills: 0 | Status: SHIPPED | OUTPATIENT
Start: 2023-08-18

## 2023-08-24 RX ORDER — TRAZODONE HYDROCHLORIDE 50 MG/1
50 TABLET ORAL NIGHTLY
Qty: 30 TABLET | Refills: 6 | Status: SHIPPED | OUTPATIENT
Start: 2023-08-24

## 2023-08-24 RX ORDER — SERTRALINE HYDROCHLORIDE 100 MG/1
50 TABLET, FILM COATED ORAL TAKE AS DIRECTED
Qty: 45 TABLET | Refills: 3 | Status: SHIPPED | OUTPATIENT
Start: 2023-08-24

## 2023-08-24 RX ORDER — DESVENLAFAXINE 50 MG/1
50 TABLET, EXTENDED RELEASE ORAL DAILY
Qty: 90 TABLET | Refills: 3 | Status: SHIPPED | OUTPATIENT
Start: 2023-08-24

## 2024-02-22 ENCOUNTER — OFFICE VISIT (OUTPATIENT)
Dept: FAMILY MEDICINE CLINIC | Facility: CLINIC | Age: 54
End: 2024-02-22
Payer: MEDICAID

## 2024-02-22 VITALS
WEIGHT: 192.9 LBS | BODY MASS INDEX: 32.14 KG/M2 | OXYGEN SATURATION: 97 % | TEMPERATURE: 98.2 F | DIASTOLIC BLOOD PRESSURE: 82 MMHG | HEIGHT: 65 IN | HEART RATE: 70 BPM | SYSTOLIC BLOOD PRESSURE: 124 MMHG

## 2024-02-22 DIAGNOSIS — E03.9 HYPOTHYROIDISM, UNSPECIFIED TYPE: ICD-10-CM

## 2024-02-22 DIAGNOSIS — R32 URINARY INCONTINENCE, UNSPECIFIED TYPE: Primary | ICD-10-CM

## 2024-02-22 DIAGNOSIS — F33.1 MAJOR DEPRESSIVE DISORDER, RECURRENT EPISODE, MODERATE DEGREE: ICD-10-CM

## 2024-02-22 DIAGNOSIS — F51.01 PRIMARY INSOMNIA: ICD-10-CM

## 2024-02-22 LAB
BILIRUB BLD-MCNC: NEGATIVE MG/DL
CLARITY, POC: CLEAR
COLOR UR: YELLOW
EXPIRATION DATE: NORMAL
GLUCOSE UR STRIP-MCNC: NEGATIVE MG/DL
KETONES UR QL: NEGATIVE
LEUKOCYTE EST, POC: NEGATIVE
Lab: NORMAL
NITRITE UR-MCNC: NEGATIVE MG/ML
PH UR: 6 [PH] (ref 5–8)
PROT UR STRIP-MCNC: NEGATIVE MG/DL
RBC # UR STRIP: NEGATIVE /UL
SP GR UR: 1 (ref 1–1.03)
UROBILINOGEN UR QL: NORMAL

## 2024-02-22 RX ORDER — TRAZODONE HYDROCHLORIDE 50 MG/1
25 TABLET ORAL NIGHTLY
Qty: 30 TABLET | Refills: 1 | Status: SHIPPED | OUTPATIENT
Start: 2024-02-22

## 2024-02-22 NOTE — PROGRESS NOTES
Office Note     Name: Anita Montano    : 1970     MRN: [id number]     Chief Complaint  Urinary Frequency (Pt is here for urinary sxs today. She is here with  ) and Sinusitis (Pt complains of discomfort in the right eye. She has concerns with sinusitis after seeing the eye doctor. )    Subjective     History of Present Illness:  Anita Montano is a 53 y.o. female who presents today for:    2-3 weeks ago had urinary intontinence for a fwe weeks  but it resolved about 2-3 weeks ago.  Has pain and pressure in the right eye for severa lweeks possible 2-3 month    Has reduced the doses of her medications because she felt they were cuasing side effects. Has weaned off of the does however still had head ache whe nshe sleeps flat      Review of Systems:   Review of Systems    Past Medical History:   Past Medical History:   Diagnosis Date    Anxiety     Depression     Hypothyroidism        Past Surgical History:   Past Surgical History:   Procedure Laterality Date    HYSTERECTOMY  2010    Non-cancerous reasons       Family History:   Family History   Problem Relation Age of Onset    Breast cancer Mother     Hypertension Mother     Asthma Father     Breast cancer Sister     Cancer Brother        Social History:   Social History     Socioeconomic History    Marital status:    Tobacco Use    Smoking status: Never     Passive exposure: Never    Smokeless tobacco: Never   Vaping Use    Vaping status: Never Used   Substance and Sexual Activity    Alcohol use: Not Currently    Drug use: Defer    Sexual activity: Defer       Immunizations:   There is no immunization history on file for this patient.     Medications:     Current Outpatient Medications:     levothyroxine (SYNTHROID, LEVOTHROID) 112 MCG tablet, Take 1 tablet by mouth Daily., Disp: 90 tablet, Rfl: 3    sertraline (ZOLOFT) 50 MG tablet, Take 1 tablet by mouth Take As Directed. Take Half Pill 1 qd, Disp: 30 tablet, Rfl: 3    traZODone  "(DESYREL) 50 MG tablet, Take 0.5 tablets by mouth Every Night., Disp: 30 tablet, Rfl: 1    Allergies:   No Known Allergies    Objective     Vital Signs  /82   Pulse 70   Temp 98.2 °F (36.8 °C)   Ht 165.1 cm (65\")   Wt 87.5 kg (192 lb 14.4 oz)   SpO2 97%   BMI 32.10 kg/m²   Estimated body mass index is 32.1 kg/m² as calculated from the following:    Height as of this encounter: 165.1 cm (65\").    Weight as of this encounter: 87.5 kg (192 lb 14.4 oz).            Physical Exam  Vitals and nursing note reviewed.   Constitutional:       General: She is not in acute distress.     Appearance: Normal appearance.   HENT:      Right Ear: Tympanic membrane normal.      Left Ear: Tympanic membrane normal.      Ears:      Comments: Hearing is grossly normal  Eyes:      General: Vision grossly intact. Gaze aligned appropriately.      Extraocular Movements: Extraocular movements intact.      Conjunctiva/sclera: Conjunctivae normal.      Pupils: Pupils are equal, round, and reactive to light.   Cardiovascular:      Rate and Rhythm: Normal rate and regular rhythm.      Heart sounds: Normal heart sounds. No murmur heard.     No friction rub. No gallop.   Pulmonary:      Effort: Pulmonary effort is normal.      Breath sounds: Normal breath sounds. No wheezing, rhonchi or rales.   Neurological:      Mental Status: She is alert and oriented to person, place, and time.      Cranial Nerves: No dysarthria or facial asymmetry.      Sensory: Sensation is intact. No sensory deficit.            Procedures     Assessment and Plan     1. Urinary incontinence, unspecified type    - Comprehensive Metabolic Panel  - CBC & Differential  - TSH Rfx On Abnormal To Free T4  - POC Urinalysis Dipstick, Automated    2. Hypothyroidism, unspecified type    - Comprehensive Metabolic Panel  - CBC & Differential  - TSH Rfx On Abnormal To Free T4  - POC Urinalysis Dipstick, Automated    3. Major depressive disorder, recurrent episode, moderate " degree    - sertraline (ZOLOFT) 50 MG tablet; Take 1 tablet by mouth Take As Directed. Take Half Pill 1 qd  Dispense: 30 tablet; Refill: 3    4. Primary insomnia    - traZODone (DESYREL) 50 MG tablet; Take 0.5 tablets by mouth Every Night.  Dispense: 30 tablet; Refill: 1       Follow Up  Return in about 3 months (around 5/22/2024).    Patient was advised to call the office or seek medical care if  any issues discussed during this visit worsen or persist or if new concerns arise        Maida Quevedo MD  [facility]  [address] [facility] [address] [phone]

## 2024-02-23 LAB
ALBUMIN SERPL-MCNC: 4.2 G/DL (ref 3.8–4.9)
ALBUMIN/GLOB SERPL: 1.6 {RATIO} (ref 1.2–2.2)
ALP SERPL-CCNC: 68 IU/L (ref 44–121)
ALT SERPL-CCNC: 15 IU/L (ref 0–32)
AST SERPL-CCNC: 18 IU/L (ref 0–40)
BASOPHILS # BLD AUTO: 0 X10E3/UL (ref 0–0.2)
BASOPHILS NFR BLD AUTO: 1 %
BILIRUB SERPL-MCNC: 0.4 MG/DL (ref 0–1.2)
BUN SERPL-MCNC: 11 MG/DL (ref 6–24)
BUN/CREAT SERPL: 14 (ref 9–23)
CALCIUM SERPL-MCNC: 9.7 MG/DL (ref 8.7–10.2)
CHLORIDE SERPL-SCNC: 103 MMOL/L (ref 96–106)
CO2 SERPL-SCNC: 24 MMOL/L (ref 20–29)
CREAT SERPL-MCNC: 0.76 MG/DL (ref 0.57–1)
EGFRCR SERPLBLD CKD-EPI 2021: 92 ML/MIN/1.73
EOSINOPHIL # BLD AUTO: 0.1 X10E3/UL (ref 0–0.4)
EOSINOPHIL NFR BLD AUTO: 1 %
ERYTHROCYTE [DISTWIDTH] IN BLOOD BY AUTOMATED COUNT: 14.4 % (ref 11.7–15.4)
GLOBULIN SER CALC-MCNC: 2.6 G/DL (ref 1.5–4.5)
GLUCOSE SERPL-MCNC: 89 MG/DL (ref 70–99)
HCT VFR BLD AUTO: 39.2 % (ref 34–46.6)
HGB BLD-MCNC: 12.8 G/DL (ref 11.1–15.9)
IMM GRANULOCYTES # BLD AUTO: 0 X10E3/UL (ref 0–0.1)
IMM GRANULOCYTES NFR BLD AUTO: 0 %
LYMPHOCYTES # BLD AUTO: 2 X10E3/UL (ref 0.7–3.1)
LYMPHOCYTES NFR BLD AUTO: 35 %
MCH RBC QN AUTO: 26.8 PG (ref 26.6–33)
MCHC RBC AUTO-ENTMCNC: 32.7 G/DL (ref 31.5–35.7)
MCV RBC AUTO: 82 FL (ref 79–97)
MONOCYTES # BLD AUTO: 0.4 X10E3/UL (ref 0.1–0.9)
MONOCYTES NFR BLD AUTO: 7 %
NEUTROPHILS # BLD AUTO: 3.1 X10E3/UL (ref 1.4–7)
NEUTROPHILS NFR BLD AUTO: 56 %
PLATELET # BLD AUTO: 272 X10E3/UL (ref 150–450)
POTASSIUM SERPL-SCNC: 4.2 MMOL/L (ref 3.5–5.2)
PROT SERPL-MCNC: 6.8 G/DL (ref 6–8.5)
RBC # BLD AUTO: 4.78 X10E6/UL (ref 3.77–5.28)
SODIUM SERPL-SCNC: 141 MMOL/L (ref 134–144)
TSH SERPL DL<=0.005 MIU/L-ACNC: 2.9 UIU/ML (ref 0.45–4.5)
WBC # BLD AUTO: 5.5 X10E3/UL (ref 3.4–10.8)

## 2024-05-09 ENCOUNTER — TELEPHONE (OUTPATIENT)
Dept: FAMILY MEDICINE CLINIC | Facility: CLINIC | Age: 54
End: 2024-05-09

## 2024-05-09 ENCOUNTER — OFFICE VISIT (OUTPATIENT)
Dept: FAMILY MEDICINE CLINIC | Facility: CLINIC | Age: 54
End: 2024-05-09
Payer: COMMERCIAL

## 2024-05-09 VITALS
WEIGHT: 193.9 LBS | OXYGEN SATURATION: 97 % | DIASTOLIC BLOOD PRESSURE: 78 MMHG | HEIGHT: 63 IN | SYSTOLIC BLOOD PRESSURE: 116 MMHG | HEART RATE: 70 BPM | BODY MASS INDEX: 34.36 KG/M2

## 2024-05-09 DIAGNOSIS — R00.1 SINUS BRADYCARDIA: ICD-10-CM

## 2024-05-09 DIAGNOSIS — R32 URINARY INCONTINENCE, UNSPECIFIED TYPE: ICD-10-CM

## 2024-05-09 DIAGNOSIS — E03.9 HYPOTHYROIDISM, UNSPECIFIED TYPE: ICD-10-CM

## 2024-05-09 DIAGNOSIS — F51.01 PRIMARY INSOMNIA: ICD-10-CM

## 2024-05-09 DIAGNOSIS — B89 PARASITE INFECTION: ICD-10-CM

## 2024-05-09 DIAGNOSIS — F33.1 MAJOR DEPRESSIVE DISORDER, RECURRENT EPISODE, MODERATE DEGREE: ICD-10-CM

## 2024-05-09 DIAGNOSIS — M54.12 CERVICAL RADICULOPATHY: ICD-10-CM

## 2024-05-09 DIAGNOSIS — Q06.9 SPINAL CORD ANOMALY: ICD-10-CM

## 2024-05-09 DIAGNOSIS — R20.2 PARESTHESIA OF BILATERAL LEGS: ICD-10-CM

## 2024-05-09 DIAGNOSIS — R94.31 ABNORMAL EKG: Primary | ICD-10-CM

## 2024-05-09 DIAGNOSIS — E78.2 MODERATE MIXED HYPERLIPIDEMIA NOT REQUIRING STATIN THERAPY: ICD-10-CM

## 2024-05-09 DIAGNOSIS — Z12.31 ENCOUNTER FOR SCREENING MAMMOGRAM FOR MALIGNANT NEOPLASM OF BREAST: ICD-10-CM

## 2024-05-09 DIAGNOSIS — R06.00 DYSPNEA, UNSPECIFIED TYPE: ICD-10-CM

## 2024-05-09 DIAGNOSIS — G25.81 RESTLESS LEG SYNDROME: ICD-10-CM

## 2024-05-09 DIAGNOSIS — M47.812 CERVICAL SPONDYLOSIS: ICD-10-CM

## 2024-05-09 PROCEDURE — 99214 OFFICE O/P EST MOD 30 MIN: CPT | Performed by: INTERNAL MEDICINE

## 2024-05-09 RX ORDER — ALBENDAZOLE 200 MG/1
TABLET, FILM COATED ORAL
Qty: 2 TABLET | Refills: 0 | Status: SHIPPED | OUTPATIENT
Start: 2024-05-09

## 2024-05-09 RX ORDER — TRAZODONE HYDROCHLORIDE 50 MG/1
25 TABLET ORAL NIGHTLY
Qty: 30 TABLET | Refills: 1 | Status: SHIPPED | OUTPATIENT
Start: 2024-05-09 | End: 2024-05-09 | Stop reason: SDUPTHER

## 2024-05-09 RX ORDER — TRAZODONE HYDROCHLORIDE 50 MG/1
25 TABLET ORAL NIGHTLY
Qty: 90 TABLET | Refills: 1 | Status: SHIPPED | OUTPATIENT
Start: 2024-05-09

## 2024-05-09 RX ORDER — ROPINIROLE 0.25 MG/1
0.25 TABLET, FILM COATED ORAL NIGHTLY
Qty: 30 TABLET | Refills: 1 | Status: SHIPPED | OUTPATIENT
Start: 2024-05-09

## 2024-05-09 RX ORDER — LEVOTHYROXINE SODIUM 112 UG/1
112 TABLET ORAL DAILY
Qty: 90 TABLET | Refills: 3 | Status: SHIPPED | OUTPATIENT
Start: 2024-05-09 | End: 2024-05-09 | Stop reason: SDUPTHER

## 2024-05-09 RX ORDER — LEVOTHYROXINE SODIUM 112 UG/1
112 TABLET ORAL DAILY
Qty: 90 TABLET | Refills: 3 | Status: SHIPPED | OUTPATIENT
Start: 2024-05-09

## 2024-05-09 NOTE — TELEPHONE ENCOUNTER
Pharmacy Name: [facility] [address] [place] [address] [phone] [facility] [phone]        Pharmacy representative name: REED    Pharmacy representative phone number: [phone]     What medication are you calling in regards to: TRAZODONE AND SERTRALINE    What question does the pharmacy have: THE TRAZODONE PRESCRIPTION IS WRITTEN FOR 90 TABLETS BUT ADVISED PATIENT TO TAKE HALF A PILL DAILY, BUT PREVIOUS PRESCRIPTION IS WRITTEN FOR ONE PILL DAILY. THE SERTRALINE WAS WRITTEN FOR 90 TABLETS BUT THE INSTRUCTIONS ADVISE TAKE A HALF PILL ONE TIME DAILY BUT ALSO INCLUDES TAKE A WHOLE PILL ONE TIME DAILY, PLEASE CLARIFY.
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
## 2024-05-10 LAB
25(OH)D3+25(OH)D2 SERPL-MCNC: 20.7 NG/ML (ref 30–100)
ALBUMIN SERPL-MCNC: 4.3 G/DL (ref 3.8–4.9)
ALBUMIN/GLOB SERPL: 1.7 {RATIO} (ref 1.2–2.2)
ALP SERPL-CCNC: 75 IU/L (ref 44–121)
ALT SERPL-CCNC: 16 IU/L (ref 0–32)
AST SERPL-CCNC: 21 IU/L (ref 0–40)
BASOPHILS # BLD AUTO: 0 X10E3/UL (ref 0–0.2)
BASOPHILS NFR BLD AUTO: 1 %
BILIRUB SERPL-MCNC: 0.3 MG/DL (ref 0–1.2)
BUN SERPL-MCNC: 10 MG/DL (ref 6–24)
BUN/CREAT SERPL: 12 (ref 9–23)
CALCIUM SERPL-MCNC: 9.5 MG/DL (ref 8.7–10.2)
CHLORIDE SERPL-SCNC: 103 MMOL/L (ref 96–106)
CHOLEST SERPL-MCNC: 238 MG/DL (ref 100–199)
CO2 SERPL-SCNC: 24 MMOL/L (ref 20–29)
CREAT SERPL-MCNC: 0.82 MG/DL (ref 0.57–1)
EGFRCR SERPLBLD CKD-EPI 2021: 84 ML/MIN/1.73
EOSINOPHIL # BLD AUTO: 0.1 X10E3/UL (ref 0–0.4)
EOSINOPHIL NFR BLD AUTO: 2 %
ERYTHROCYTE [DISTWIDTH] IN BLOOD BY AUTOMATED COUNT: 14.3 % (ref 11.7–15.4)
FOLATE SERPL-MCNC: >20 NG/ML
GLOBULIN SER CALC-MCNC: 2.6 G/DL (ref 1.5–4.5)
GLUCOSE SERPL-MCNC: 89 MG/DL (ref 70–99)
HBA1C MFR BLD: 5.9 % (ref 4.8–5.6)
HCT VFR BLD AUTO: 41 % (ref 34–46.6)
HDLC SERPL-MCNC: 70 MG/DL
HGB BLD-MCNC: 13 G/DL (ref 11.1–15.9)
IMM GRANULOCYTES # BLD AUTO: 0 X10E3/UL (ref 0–0.1)
IMM GRANULOCYTES NFR BLD AUTO: 0 %
LDLC SERPL CALC-MCNC: 134 MG/DL (ref 0–99)
LYMPHOCYTES # BLD AUTO: 2.3 X10E3/UL (ref 0.7–3.1)
LYMPHOCYTES NFR BLD AUTO: 34 %
MCH RBC QN AUTO: 26.3 PG (ref 26.6–33)
MCHC RBC AUTO-ENTMCNC: 31.7 G/DL (ref 31.5–35.7)
MCV RBC AUTO: 83 FL (ref 79–97)
MONOCYTES # BLD AUTO: 0.5 X10E3/UL (ref 0.1–0.9)
MONOCYTES NFR BLD AUTO: 7 %
NEUTROPHILS # BLD AUTO: 3.7 X10E3/UL (ref 1.4–7)
NEUTROPHILS NFR BLD AUTO: 56 %
PLATELET # BLD AUTO: 291 X10E3/UL (ref 150–450)
POTASSIUM SERPL-SCNC: 4.4 MMOL/L (ref 3.5–5.2)
PROT SERPL-MCNC: 6.9 G/DL (ref 6–8.5)
RBC # BLD AUTO: 4.94 X10E6/UL (ref 3.77–5.28)
SODIUM SERPL-SCNC: 142 MMOL/L (ref 134–144)
TRIGL SERPL-MCNC: 195 MG/DL (ref 0–149)
TSH SERPL DL<=0.005 MIU/L-ACNC: 3.62 UIU/ML (ref 0.45–4.5)
VIT B12 SERPL-MCNC: 234 PG/ML (ref 232–1245)
VLDLC SERPL CALC-MCNC: 34 MG/DL (ref 5–40)
WBC # BLD AUTO: 6.6 X10E3/UL (ref 3.4–10.8)

## 2024-05-10 NOTE — TELEPHONE ENCOUNTER
Pharmacy Name: [facility] [address] [place] [address] [phone] [facility] [phone]        Pharmacy phone number: [phone]    What medication are you calling in regards to: SERTRALINE    What question does the pharmacy have: NEEDS CLARIFICATION ON DIRECTIONS    Who is the provider that prescribed the medication: DR. JOSE LOERA

## 2024-05-15 ENCOUNTER — TRANSCRIBE ORDERS (OUTPATIENT)
Dept: ADMINISTRATIVE | Facility: HOSPITAL | Age: 54
End: 2024-05-15
Payer: COMMERCIAL

## 2024-05-15 DIAGNOSIS — Z12.31 VISIT FOR SCREENING MAMMOGRAM: Primary | ICD-10-CM

## 2024-05-16 ENCOUNTER — OFFICE VISIT (OUTPATIENT)
Dept: CARDIOLOGY | Facility: CLINIC | Age: 54
End: 2024-05-16
Payer: COMMERCIAL

## 2024-05-16 VITALS
TEMPERATURE: 97.1 F | BODY MASS INDEX: 34.38 KG/M2 | SYSTOLIC BLOOD PRESSURE: 112 MMHG | DIASTOLIC BLOOD PRESSURE: 68 MMHG | RESPIRATION RATE: 16 BRPM | WEIGHT: 194 LBS | HEIGHT: 63 IN | OXYGEN SATURATION: 98 % | HEART RATE: 62 BPM

## 2024-05-16 DIAGNOSIS — R94.31 ABNORMAL EKG: ICD-10-CM

## 2024-05-16 DIAGNOSIS — E78.2 MIXED HYPERLIPIDEMIA: ICD-10-CM

## 2024-05-16 DIAGNOSIS — R06.02 SHORTNESS OF BREATH: Primary | ICD-10-CM

## 2024-05-16 DIAGNOSIS — R07.9 CHEST PAIN, UNSPECIFIED TYPE: ICD-10-CM

## 2024-05-16 PROCEDURE — 93000 ELECTROCARDIOGRAM COMPLETE: CPT | Performed by: INTERNAL MEDICINE

## 2024-05-16 PROCEDURE — 99204 OFFICE O/P NEW MOD 45 MIN: CPT | Performed by: INTERNAL MEDICINE

## 2024-05-16 NOTE — PROGRESS NOTES
[facility] [place]  [facility] CARDIOLOGY  [address] [place] [address]  Dept: [phone]  Dept Fax: [phone]    Date: 05/16/2024  Patient: Anita Montano  YOB: 1970    New Patient Office Note    Consult Reason:  Ms. Anita Montano is a 54 y.o. female who presents to the clinic to establish care, seen for Chest Pain and Shortness of Breath.   Patient complaining of chest pain and worsening shortness of breath for the last month.  Pain worse with deep breathing and chest pressure.  Patient admits decreased exercise capacity for the last month as well.  Patient denies exertional angina, orthopnea, PND, palpitations, lightheadedness, syncope or medications side-effects.    The following portions of the patient's history were reviewed and updated as appropriate: allergies, current medications, past family history, past medical history, past social history, past surgical history, and problem list.    Medications: No Known Allergies   Current Outpatient Medications   Medication Instructions    albendazole (ALBENZA) 200 MG tablet Once now, repeat in 1 week    levothyroxine (SYNTHROID, LEVOTHROID) 112 mcg, Oral, Daily    rOPINIRole (REQUIP) 0.25 mg, Oral, Nightly, Take 1 hour before bedtime.    sertraline (ZOLOFT) 25 mg, Oral, Take As Directed, Take Half Pill 1 qd    traZODone (DESYREL) 25 mg, Oral, Nightly       Subjective  Past Medical History:   Diagnosis Date    Anxiety     Depression     Hypothyroidism        Past Surgical History:   Procedure Laterality Date    HYSTERECTOMY  2010    Non-cancerous reasons       Family History   Problem Relation Age of Onset    Breast cancer Mother     Hypertension Mother     Asthma Father     Breast cancer Sister     Cancer Brother         Social History     Socioeconomic History    Marital status:    Tobacco Use    Smoking status: Never     Passive exposure: Never    Smokeless tobacco: Never   Vaping Use    Vaping  "status: Never Used   Substance and Sexual Activity    Alcohol use: Not Currently    Drug use: Defer    Sexual activity: Defer       Objective  Vitals:    05/16/24 1110   BP: 112/68   Pulse: 62   Resp: 16   Temp: 97.1 °F (36.2 °C)   SpO2: 98%   Weight: 88 kg (194 lb)   Height: 160 cm (62.99\")   PainSc: 0-No pain     Vitals:    05/16/24 1110   BP: 112/68   Pulse: 62   Resp: 16   Temp: 97.1 °F (36.2 °C)   SpO2: 98%   Weight: 88 kg (194 lb)   Height: 160 cm (62.99\")        Physical Exam  Constitutional:       Appearance: Healthy appearance. Not in distress.   Eyes:      Pupils: Pupils are equal, round, and reactive to light.   HENT:    Mouth/Throat:      Mouth: Mucous membranes are moist.   Neck:      Vascular: No carotid bruit, hepatojugular reflux, JVD or JVR. JVD normal.   Pulmonary:      Effort: Pulmonary effort is normal.      Breath sounds: Normal breath sounds. No wheezing. No rhonchi. No rales.   Chest:      Chest wall: Not tender to palpatation.          Comments: Chest wall tenderness  Cardiovascular:      PMI at left midclavicular line. Normal rate. Regular rhythm. Normal S1 with normal intensity. Normal S2 with normal intensity.       Murmurs: There is no murmur.      No gallop.  No click. No rub.   Pulses:     Carotid: 4+ bilaterally.     Radial: 4+ bilaterally.     Popliteal: 4+ bilaterally.     Dorsalis pedis: 4+ bilaterally.  Edema:     Peripheral edema absent.   Abdominal:      General: There is no abdominal bruit.   Skin:     General: Skin is warm.   Neurological:      Mental Status: Alert and oriented to person, place and time.              Labs:  Lab Results   Component Value Date     05/09/2024    K 4.4 05/09/2024     05/09/2024    CO2 24 05/09/2024    BUN 10 05/09/2024    CREATININE 0.82 05/09/2024    CALCIUM 9.5 05/09/2024    BILITOT 0.3 05/09/2024    ALKPHOS 75 05/09/2024    ALT 16 05/09/2024    AST 21 05/09/2024    GLUCOSE 89 05/09/2024    ALBUMIN 4.3 05/09/2024     Lab Results " "  Component Value Date    WBC 6.6 05/09/2024    HGB 13.0 05/09/2024    HCT 41.0 05/09/2024     05/09/2024     No results found for: \"APTT\", \"INR\", \"PTT\"  No results found for: \"CKTOTAL\", \"TROPONINI\", \"TROPONINT\", \"CKMBINDEX\", \"BNP\"  No results found for: \"BNP\", \"PROBNP\"    Lab Results   Component Value Date    CHLPL 238 (H) 05/09/2024    TRIG 195 (H) 05/09/2024    HDL 70 05/09/2024     (H) 05/09/2024     Lab Results   Component Value Date    TSH 3.620 05/09/2024       The 10-year ASCVD risk score (Marian NGUYEN, et al., 2019) is: 1.6%    Values used to calculate the score:      Age: 54 years      Sex: Female      Is Non- : No      Diabetic: No      Tobacco smoker: No      Systolic Blood Pressure: 112 mmHg      Is BP treated: No      HDL Cholesterol: 70 mg/dL      Total Cholesterol: 238 mg/dL     CV Diagnostics:    ECG 12 Lead    Date/Time: 5/16/2024 11:39 AM  Performed by: Theo Davila MD    Authorized by: Theo Davila MD  Comparison: compared with previous ECG from 5/9/2024  Comparison to previous ECG: Left atrial enlargement and T wave abnormality not identified on current EKG  Rhythm: sinus rhythm    Clinical impression: normal ECG          CXR: No results found for this or any previous visit.     ECHO/MUGA: No results found for this or any previous visit.     STRESS TESTS: No results found for this or any previous visit.     CARDIAC CATH: No results found for this or any previous visit.     DEVICES: No valid procedures specified.   HOLTER: No results found for this or any previous visit.     CT/MRI:  No results found for this or any previous visit.    VASCULAR: No valid procedures specified.     Assessment and Plan  Diagnoses and all orders for this visit:    1. Shortness of breath (Primary)  Assessment & Plan:  Patient with decreased exercise capacity for the last 1 month.  Shortness of breath type need to take deep breaths.  Chest pain recently, respirophasic, worse to " touch, not exacerbated by exercise.  Chest wall tenderness on exam reproducing patient chest pain. EKG today normal.  Risk factor: Hyperlipidemia. Plan:  Treadmill stress test for diagnostic and reassurance purposes, as well as assess exercise capacity  Refer for transthoracic echocardiogram    Orders:  -     Adult Transthoracic Echo Complete W/ Cont if Necessary Per Protocol; Future    2. Abnormal EKG  Assessment & Plan:  Left atrial enlargement and T wave anomaly not reproduced on current EKG, suggesting possible dynamic changes. Plan:  Stress test and echocardiogram as above      3. Mixed hyperlipidemia  Assessment & Plan:   Lipid abnormalities are newly identified    Plan:  Lipid lowering therapy not prescribed due to low ASCVD score and trial of therapeutic lifestyle changes.  If cardiac workup returns abnormal, we will move forward with lipid-lowering therapy.    Discussed medication dosage, use, side effects, and goals of treatment in detail.    Counseled patient on lifestyle modifications to help control hyperlipidemia.   Cholesterol lowering dietary information shared with patient.  Advised patient to exercise for 150 minutes weekly. (30 minute brisk walk, 5 days a week for example)  Weight Loss encouraged  Patient counseled in regards to heart healthy, low fat/ low cholesterol/low sat diet, daily exercise for 30 minutes, low to moderate intensity, and weight loss.    Lab Results   Component Value Date    CHLPL 238 (H) 05/09/2024    TRIG 195 (H) 05/09/2024    HDL 70 05/09/2024     (H) 05/09/2024     Goal of therapy: LDL  mg/dL    Followup in 6 months.      4. Chest pain, unspecified type  Assessment & Plan:  Atypical.  Features suggesting musculoskeletal.  EKG changes during primary care visit, resolved during cardio visit.  In view of dynamic EKG changes, will consider moving forward with ischemic workup with readmill stress test.    Orders:  -     Treadmill Stress Test; Future  -     XR Chest  PA & Lateral; Future  -     Troponin T  -     D-dimer, Quantitative  -     Magnesium    Other orders  -     ECG 12 Lead         Return in about 3 months (around 8/16/2024) for Follow-up with Dr Davila.    There are no Patient Instructions on file for this visit.    Theo Davila MD

## 2024-05-16 NOTE — ASSESSMENT & PLAN NOTE
Patient with decreased exercise capacity for the last 1 month.  Shortness of breath type need to take deep breaths.  Chest pain recently, respirophasic, worse to touch, not exacerbated by exercise.  Chest wall tenderness on exam reproducing patient chest pain. EKG today normal.  Risk factor: Hyperlipidemia. Plan:  Treadmill stress test for diagnostic and reassurance purposes, as well as assess exercise capacity  Refer for transthoracic echocardiogram

## 2024-05-16 NOTE — ASSESSMENT & PLAN NOTE
Lipid abnormalities are newly identified    Plan:  Lipid lowering therapy not prescribed due to low ASCVD score and trial of therapeutic lifestyle changes.  If cardiac workup returns abnormal, we will move forward with lipid-lowering therapy.    Discussed medication dosage, use, side effects, and goals of treatment in detail.    Counseled patient on lifestyle modifications to help control hyperlipidemia.   Cholesterol lowering dietary information shared with patient.  Advised patient to exercise for 150 minutes weekly. (30 minute brisk walk, 5 days a week for example)  Weight Loss encouraged  Patient counseled in regards to heart healthy, low fat/ low cholesterol/low sat diet, daily exercise for 30 minutes, low to moderate intensity, and weight loss.    Lab Results   Component Value Date    CHLPL 238 (H) 05/09/2024    TRIG 195 (H) 05/09/2024    HDL 70 05/09/2024     (H) 05/09/2024     Goal of therapy: LDL  mg/dL    Followup in 6 months.

## 2024-05-16 NOTE — ASSESSMENT & PLAN NOTE
Atypical.  Features suggesting musculoskeletal.  EKG changes during primary care visit, resolved during cardio visit.  In view of dynamic EKG changes, will consider moving forward with ischemic workup with readmill stress test.

## 2024-05-16 NOTE — ASSESSMENT & PLAN NOTE
Left atrial enlargement and T wave anomaly not reproduced on current EKG, suggesting possible dynamic changes. Plan:  Stress test and echocardiogram as above

## 2024-05-17 LAB
D DIMER PPP FEU-MCNC: <0.2 MG/L FEU (ref 0–0.49)
MAGNESIUM SERPL-MCNC: 2.2 MG/DL (ref 1.6–2.3)
TROPONIN T SERPL HS-MCNC: <6 NG/L (ref 0–14)

## 2024-05-28 RX ORDER — TRAZODONE HYDROCHLORIDE 50 MG/1
25 TABLET ORAL NIGHTLY
Qty: 90 TABLET | Refills: 1 | Status: SHIPPED | OUTPATIENT
Start: 2024-05-28

## 2024-07-02 ENCOUNTER — TELEPHONE (OUTPATIENT)
Dept: FAMILY MEDICINE CLINIC | Facility: CLINIC | Age: 54
End: 2024-07-02

## 2024-07-02 NOTE — TELEPHONE ENCOUNTER
Caller: JAD CEBALLOS    Relationship: Spouse    Best call back number: [phone]    Caller requesting test results: YES     What test was performed: LABS     When was the test performed: MAY 2024    Where was the test performed: IN OFFICE     Additional notes: PATIENTS  IS REQUESTING TO SPEAK TO KEMAL OR DR EVARISTO JOHNSON ABOUT LABS FROM 2 MONTHS AGO. PLEASE ADVISE

## 2024-07-02 NOTE — TELEPHONE ENCOUNTER
Left message hub to relay      Your labs looked good overall.     Your recent lab work showed that your cholesterol came back in the borderline range.  This means it is a little higher we would like but not high enough to need medication at this time.  I will plan on rechecking this in about 6 or 12 months to see if it is getting better or worse.  In the meantime try to limit the amount of fried fatty or greasy foods that you eat, and to be sure to increase the amount of fiber in your diet. For example more oatmeal, fruits, vegetables and beans in the diet can help with this.  Lean meats such as chicken will have less fat and cholesterol than high fat meats such as martinez, sausage, an fatty cuts of beef. Try to do more baking or grilling and less frying when cooking your foods     Your sugar and A1c was in the prediabetes range.  Prediabetes means that your sugar is a little higher than normal but not high enough to be considered diabetic. However, it DOES make you more likely to become diabetic down the road in the future if it does not improve. Things you can do to help prevent this from getting worse is to decrease the amount of sugars and starches.  This includes really cutting back on things like soda, sweet tea, juice, sweets and candy, as well as bread, pasta and potatoes.  I recommend scheduling an appointment in 6 months to recheck the labs.  It is usually easier to go ahead and schedule it now so that in 6 months you don't forget or have difficulty getting an appointment.     The blood count and thyroid were normal.

## 2024-07-02 NOTE — TELEPHONE ENCOUNTER
Your labs looked good overall.     Your recent lab work showed that your cholesterol came back in the borderline range.  This means it is a little higher we would like but not high enough to need medication at this time.  I will plan on rechecking this in about 6 or 12 months to see if it is getting better or worse.  In the meantime try to limit the amount of fried fatty or greasy foods that you eat, and to be sure to increase the amount of fiber in your diet. For example more oatmeal, fruits, vegetables and beans in the diet can help with this.  Lean meats such as chicken will have less fat and cholesterol than high fat meats such as martinez, sausage, an fatty cuts of beef. Try to do more baking or grilling and less frying when cooking your foods     Your sugar and A1c was in the prediabetes range.  Prediabetes means that your sugar is a little higher than normal but not high enough to be considered diabetic. However, it DOES make you more likely to become diabetic down the road in the future if it does not improve. Things you can do to help prevent this from getting worse is to decrease the amount of sugars and starches.  This includes really cutting back on things like soda, sweet tea, juice, sweets and candy, as well as bread, pasta and potatoes.  I recommend scheduling an appointment in 6 months to recheck the labs.  It is usually easier to go ahead and schedule it now so that in 6 months you don't forget or have difficulty getting an appointment.     The blood count and thyroid were normal.

## 2024-07-09 ENCOUNTER — TELEPHONE (OUTPATIENT)
Dept: CARDIOLOGY | Facility: CLINIC | Age: 54
End: 2024-07-09
Payer: COMMERCIAL

## 2024-07-09 NOTE — TELEPHONE ENCOUNTER
----- Message from Theo Davila sent at 7/8/2024  5:13 PM EDT -----  Please inform patient that her treadmill Stress Test was normal.  Nonetheless concern about decreased exercise capacity and her Duke treadmill score coming back as moderate risk for coronary artery disease.  Encourage weight loss and daily cardio exercise.  Thank you!

## 2024-07-09 NOTE — TELEPHONE ENCOUNTER
----- Message from Theo Davila sent at 7/9/2024  7:56 AM EDT -----  Please inform patient that her transthoracic echocardiogram was normal. Thank you!

## 2024-07-31 ENCOUNTER — HOSPITAL ENCOUNTER (OUTPATIENT)
Dept: MAMMOGRAPHY | Facility: HOSPITAL | Age: 54
Discharge: HOME | End: 2024-07-31
Admitting: INTERNAL MEDICINE
Payer: COMMERCIAL

## 2024-07-31 DIAGNOSIS — Z12.31 VISIT FOR SCREENING MAMMOGRAM: ICD-10-CM

## 2024-07-31 PROCEDURE — 77067 SCR MAMMO BI INCL CAD: CPT

## 2024-07-31 PROCEDURE — 77063 BREAST TOMOSYNTHESIS BI: CPT

## 2024-08-19 ENCOUNTER — OFFICE VISIT (OUTPATIENT)
Dept: CARDIOLOGY | Facility: CLINIC | Age: 54
End: 2024-08-19
Payer: COMMERCIAL

## 2024-08-19 VITALS
WEIGHT: 200.4 LBS | HEART RATE: 68 BPM | BODY MASS INDEX: 35.51 KG/M2 | SYSTOLIC BLOOD PRESSURE: 124 MMHG | DIASTOLIC BLOOD PRESSURE: 82 MMHG | RESPIRATION RATE: 18 BRPM | HEIGHT: 63 IN | OXYGEN SATURATION: 99 %

## 2024-08-19 DIAGNOSIS — R06.02 SHORTNESS OF BREATH: ICD-10-CM

## 2024-08-19 DIAGNOSIS — R07.9 CHEST PAIN, UNSPECIFIED TYPE: Primary | ICD-10-CM

## 2024-08-19 DIAGNOSIS — E78.2 MIXED HYPERLIPIDEMIA: ICD-10-CM

## 2024-08-19 PROCEDURE — 99214 OFFICE O/P EST MOD 30 MIN: CPT | Performed by: INTERNAL MEDICINE

## 2024-08-19 RX ORDER — ROSUVASTATIN CALCIUM 5 MG/1
5 TABLET, COATED ORAL DAILY
Qty: 90 TABLET | Refills: 3 | Status: SHIPPED | OUTPATIENT
Start: 2024-08-19

## 2024-08-19 NOTE — PROGRESS NOTES
"[facility] [place]  [facility] CARDIOLOGY  [address] [place] [address]  Dept: [phone]  Dept Fax: [phone]    Date: 08/19/2024  Patient: Anita Montano  YOB: 1970    Follow Up Office Visit Note    Interval Follow-up  Ms. Anita Montano is a 54 y.o. female who is here for follow-up on Shortness of Breath.    Subjective   Patient doing well with no complaints.  Patient denies angina, orthopnea, PND, palpitations, lightheadedness, syncope or medications side-effects.      The following portions of the patient's history were reviewed and updated as appropriate: allergies, current medications, past family history, past medical history, past social history, past surgical history, and problem list.    Medications: No Known Allergies   Current Outpatient Medications   Medication Instructions    albendazole (ALBENZA) 200 MG tablet Once now, repeat in 1 week    levothyroxine (SYNTHROID, LEVOTHROID) 112 mcg, Oral, Daily    rOPINIRole (REQUIP) 0.25 mg, Oral, Nightly, Take 1 hour before bedtime.    rosuvastatin (CRESTOR) 5 mg, Oral, Daily    sertraline (ZOLOFT) 25 mg, Oral, Daily    traZODone (DESYREL) 25 mg, Oral, Nightly       Tobacco Use: Low Risk  (8/19/2024)    Patient History     Smoking Tobacco Use: Never     Smokeless Tobacco Use: Never     Passive Exposure: Never        Objective  Vitals:    08/19/24 1037   BP: 124/82   Pulse: 68   Resp: 18   SpO2: 99%   Weight: 90.9 kg (200 lb 6.4 oz)   Height: 160 cm (63\")   PainSc: 0-No pain      Vitals:    08/19/24 1037   BP: 124/82   Pulse: 68   Resp: 18   SpO2: 99%   Weight: 90.9 kg (200 lb 6.4 oz)   Height: 160 cm (63\")          Physical Exam  Constitutional:       Appearance: Not in distress.   Neck:      Vascular: JVD normal.   Pulmonary:      Breath sounds: Normal breath sounds.   Cardiovascular:      Normal rate. Regular rhythm.      Murmurs: There is no murmur.      Comments: Trace bilateral lower extreme edema " "with poor circulation  Pulses:     Intact distal pulses.   Edema:     Thigh: bilateral trace edema of the thigh.     Pretibial: bilateral trace edema of the pretibial area.     Ankle: bilateral trace edema of the ankle.     Feet: bilateral trace edema of the feet.  Neurological:      Mental Status: Alert.              Diagnostic Data  Lab Results   Component Value Date     05/09/2024    K 4.4 05/09/2024     05/09/2024    CO2 24 05/09/2024    MG 2.2 05/16/2024    BUN 10 05/09/2024    CREATININE 0.82 05/09/2024    CALCIUM 9.5 05/09/2024    BILITOT 0.3 05/09/2024    ALKPHOS 75 05/09/2024    ALT 16 05/09/2024    AST 21 05/09/2024    GLUCOSE 89 05/09/2024    ALBUMIN 4.3 05/09/2024     Lab Results   Component Value Date    WBC 6.6 05/09/2024    HGB 13.0 05/09/2024    HCT 41.0 05/09/2024     05/09/2024     No results found for: \"APTT\", \"INR\", \"PTT\"  Lab Results   Component Value Date    TROPONINT <6 05/16/2024     No results found for: \"BNP\", \"PROBNP\"    Lab Results   Component Value Date    CHLPL 238 (H) 05/09/2024    TRIG 195 (H) 05/09/2024    HDL 70 05/09/2024     (H) 05/09/2024     Lab Results   Component Value Date    TSH 3.620 05/09/2024       CV Diagnostics:  Procedures    CXR: Results for orders placed in visit on 05/16/24    XR Chest PA & Lateral    Narrative  XR CHEST PA AND LATERAL    Date of Exam: 5/16/2024 1:56 PM EDT    Indication: Chest Pain    Comparison: None available.    Findings:  Borderline enlarged cardiac silhouette. No focal consolidation. No pleural effusion. No pneumothorax. Osseous structures demonstrate no acute process.    Impression  Impression:  No acute cardiopulmonary findings. Borderline enlarged cardiac silhouette.      Electronically Signed: Dave Potts MD  5/20/2024 10:54 AM EDT  Workstation ID: [id number]     ECHO/MUGA: Results for orders placed in visit on 07/08/24    Adult Transthoracic Echo Complete W/ Cont if Necessary Per Protocol    Interpretation " Summary    Left ventricular systolic function is normal. Estimated left ventricular EF = 60% Left ventricular ejection fraction appears to be 56 - 60%.    Left ventricular wall thickness is consistent with borderline concentric hypertrophy.    Left ventricular diastolic function is consistent with age.    Estimated right ventricular systolic pressure from tricuspid regurgitation is normal (<35 mmHg).     STRESS TESTS: Results for orders placed in visit on 07/08/24    Treadmill Stress Test    Interpretation Summary    Findings consistent with a normal ECG stress test.    No ECG evidence of myocardial ischemia.    Negative clinical evidence of myocardial ischemia.    The Duke Treadmill Score of 0.3 is consistent with a moderate risk for ischemic heart disease.    Mildly impaired exercise capacity.     CARDIAC CATH: No results found for this or any previous visit.     DEVICES: No valid procedures specified.   HOLTER: No results found for this or any previous visit.     CT/MRI:  No results found for this or any previous visit.    VASCULAR: No valid procedures specified.     Assessment and Plan  Diagnoses and all orders for this visit:    1. Chest pain, unspecified type (Primary)  Assessment & Plan:  No recurrence.  Past atypical. Features suggesting musculoskeletal. EKG changes during primary care visit, resolved during cardio visit.  Stress test and echo negative.  Reassurance provided.      2. Shortness of breath  Assessment & Plan:  Patient with decreased exercise capacity for the last 1 month.  Shortness of breath type need to take deep breaths.  Chest pain recently, respirophasic, worse to touch, not exacerbated by exercise.  Chest wall tenderness on exam reproducing patient chest pain. EKG normal.  Transthoracic echocardiogram age-appropriate.  Stress test negative.  Risk factors: Hyperlipidemia. Plan:  Patient counseled in regards to heart healthy, low fat/ low cholesterol/low sat diet, daily exercise for 30  minutes, low to moderate intensity, and weight loss.  After discussion with patient start rosuvastatin 5 mg p.o. daily for hyperlipidemia and primary prevention of cardiovascular diseases       3. Mixed hyperlipidemia  Assessment & Plan:   Lipid abnormalities are newly identified    Plan:  Begin taking the following medication/s; rosuvastatin 5 mg p.o. daily.      Discussed medication dosage, use, side effects, and goals of treatment in detail.    Counseled patient on lifestyle modifications to help control hyperlipidemia.   Cholesterol lowering dietary information shared with patient.  Advised patient to exercise for 150 minutes weekly. (30 minute brisk walk, 5 days a week for example)  Weight Loss encouraged  Patient counseled in regards to heart healthy, low fat/ low cholesterol/low sat diet, daily exercise for 30 minutes, low to moderate intensity, and weight loss.  Lab Results   Component Value Date     (H) 05/09/2024     (H) 04/04/2023    HDL 70 05/09/2024    HDL 80 04/04/2023    CHLPL 238 (H) 05/09/2024    CHLPL 226 (H) 04/04/2023    TRIG 195 (H) 05/09/2024    TRIG 119 04/04/2023       Patient Treatment Goals:   LDL goal is less than 70    Followup in 6 months.      Other orders  -     rosuvastatin (CRESTOR) 5 MG tablet; Take 1 tablet by mouth Daily.  Dispense: 90 tablet; Refill: 3         Return in about 6 months (around 2/19/2025) for Follow-up with Dr Davila.    There are no Patient Instructions on file for this visit.    Theo Davila MD

## 2024-08-19 NOTE — ASSESSMENT & PLAN NOTE
Patient with decreased exercise capacity for the last 1 month.  Shortness of breath type need to take deep breaths.  Chest pain recently, respirophasic, worse to touch, not exacerbated by exercise.  Chest wall tenderness on exam reproducing patient chest pain. EKG normal.  Transthoracic echocardiogram age-appropriate.  Stress test negative.  Risk factors: Hyperlipidemia. Plan:  Patient counseled in regards to heart healthy, low fat/ low cholesterol/low sat diet, daily exercise for 30 minutes, low to moderate intensity, and weight loss.  After discussion with patient start rosuvastatin 5 mg p.o. daily for hyperlipidemia and primary prevention of cardiovascular diseases

## 2024-08-19 NOTE — ASSESSMENT & PLAN NOTE
Lipid abnormalities are newly identified    Plan:  Begin taking the following medication/s; rosuvastatin 5 mg p.o. daily.      Discussed medication dosage, use, side effects, and goals of treatment in detail.    Counseled patient on lifestyle modifications to help control hyperlipidemia.   Cholesterol lowering dietary information shared with patient.  Advised patient to exercise for 150 minutes weekly. (30 minute brisk walk, 5 days a week for example)  Weight Loss encouraged  Patient counseled in regards to heart healthy, low fat/ low cholesterol/low sat diet, daily exercise for 30 minutes, low to moderate intensity, and weight loss.  Lab Results   Component Value Date     (H) 05/09/2024     (H) 04/04/2023    HDL 70 05/09/2024    HDL 80 04/04/2023    CHLPL 238 (H) 05/09/2024    CHLPL 226 (H) 04/04/2023    TRIG 195 (H) 05/09/2024    TRIG 119 04/04/2023       Patient Treatment Goals:   LDL goal is less than 70    Followup in 6 months.

## 2024-08-19 NOTE — ASSESSMENT & PLAN NOTE
No recurrence.  Past atypical. Features suggesting musculoskeletal. EKG changes during primary care visit, resolved during cardio visit.  Stress test and echo negative.  Reassurance provided.

## 2025-01-14 ENCOUNTER — TELEPHONE (OUTPATIENT)
Dept: CARDIOLOGY | Facility: CLINIC | Age: 55
End: 2025-01-14
Payer: COMMERCIAL

## 2025-02-10 ENCOUNTER — OFFICE VISIT (OUTPATIENT)
Dept: CARDIOLOGY | Facility: CLINIC | Age: 55
End: 2025-02-10
Payer: COMMERCIAL

## 2025-02-10 VITALS
RESPIRATION RATE: 18 BRPM | WEIGHT: 202.4 LBS | BODY MASS INDEX: 35.86 KG/M2 | HEIGHT: 63 IN | SYSTOLIC BLOOD PRESSURE: 118 MMHG | DIASTOLIC BLOOD PRESSURE: 84 MMHG | HEART RATE: 69 BPM | OXYGEN SATURATION: 99 %

## 2025-02-10 DIAGNOSIS — R07.9 CHEST PAIN, UNSPECIFIED TYPE: Primary | ICD-10-CM

## 2025-02-10 DIAGNOSIS — R20.8 BURNING SENSATION OF LOWER EXTREMITY: ICD-10-CM

## 2025-02-10 DIAGNOSIS — E78.2 MIXED HYPERLIPIDEMIA: ICD-10-CM

## 2025-02-10 DIAGNOSIS — R06.02 SHORTNESS OF BREATH: ICD-10-CM

## 2025-02-10 PROCEDURE — 99214 OFFICE O/P EST MOD 30 MIN: CPT | Performed by: INTERNAL MEDICINE

## 2025-02-10 RX ORDER — ROSUVASTATIN CALCIUM 5 MG/1
5 TABLET, COATED ORAL DAILY
Qty: 90 TABLET | Refills: 1 | Status: SHIPPED | OUTPATIENT
Start: 2025-02-10 | End: 2025-02-10 | Stop reason: SDUPTHER

## 2025-02-10 RX ORDER — ROSUVASTATIN CALCIUM 5 MG/1
5 TABLET, COATED ORAL DAILY
Qty: 90 TABLET | Refills: 1 | Status: SHIPPED | OUTPATIENT
Start: 2025-02-10

## 2025-02-10 NOTE — ASSESSMENT & PLAN NOTE
Lipid abnormalities are improving on medical therapy.     Plan:  Change rosuvastatin back to 5 mg p.o. daily due to possible neuropathic pain related to statin use.       Discussed medication dosage, use, side effects, and goals of treatment in detail.    Counseled patient on lifestyle modifications to help control hyperlipidemia.   Cholesterol lowering dietary information shared with patient.  Advised patient to exercise for 150 minutes weekly. (30 minute brisk walk, 5 days a week for example)  Weight Loss encouraged  Patient counseled in regards to heart healthy, low fat/ low cholesterol/low sat diet, daily exercise for 30 minutes, low to moderate intensity, and weight loss.  Lipid profile as per PCP office to target less than 70 mg/dL.    Patient Treatment Goals:   LDL goal is less than 70     Followup in 6 months.

## 2025-02-10 NOTE — PROGRESS NOTES
"[facility]  [facility] C[facility]OLOG[facility]  [address] [place] [address]  Dept: [phone]  Dept Fax: [phone]    Date: 02/10/2025  Patient: Anita Montano  YOB: 1970    Follow Up Office Visit Note    Interval Follow-up  Ms. Anita Montano is a 54 y.o. female who is here for follow-up on Chest Pain.    Subjective   Patient overall doing well with no acute complaints.  Breathing overall significantly improved.  In the interim, PCP increased dose of rosuvastatin to 10 mg p.o. daily.  Patient complaining of burning sensation and heat in the lower extremities bilaterally up to the knees.  Patient denies angina, orthopnea, PND, palpitations, lightheadedness, syncope.    The following portions of the patient's history were reviewed and updated as appropriate: allergies, current medications, past family history, past medical history, past social history, past surgical history, and problem list.    Medications: No Known Allergies   Current Outpatient Medications   Medication Instructions    levothyroxine (SYNTHROID, LEVOTHROID) 112 mcg, Oral, Daily    rosuvastatin (CRESTOR) 5 mg, Oral, Daily    sertraline (ZOLOFT) 25 mg, Oral, Daily    traZODone (DESYREL) 25 mg, Oral, Nightly       Tobacco Use: Low Risk  (2/10/2025)    Patient History     Smoking Tobacco Use: Never     Smokeless Tobacco Use: Never     Passive Exposure: Never        Objective  Vitals:    02/10/25 1129   BP: 118/84   Pulse: 69   Resp: 18   SpO2: 99%   Weight: 91.8 kg (202 lb 6.4 oz)   Height: 160 cm (63\")   PainSc: 0-No pain      Vitals:    02/10/25 1129   BP: 118/84   Pulse: 69   Resp: 18   SpO2: 99%   Weight: 91.8 kg (202 lb 6.4 oz)   Height: 160 cm (63\")          Physical Exam  Constitutional:       Appearance: Not in distress.   Neck:      Vascular: JVD normal.   Pulmonary:      Breath sounds: Normal breath sounds.   Cardiovascular:      Normal rate. Regular rhythm.      Murmurs: There is no " "murmur.   Pulses:     Intact distal pulses.   Edema:     Peripheral edema absent.   Neurological:      Mental Status: Alert.              Diagnostic Data  Lab Results   Component Value Date     05/09/2024    K 4.4 05/09/2024     05/09/2024    CO2 24 05/09/2024    MG 2.2 05/16/2024    BUN 10 05/09/2024    CREATININE 0.82 05/09/2024    CALCIUM 9.5 05/09/2024    BILITOT 0.3 05/09/2024    ALKPHOS 75 05/09/2024    ALT 16 05/09/2024    AST 21 05/09/2024    GLUCOSE 89 05/09/2024    ALBUMIN 4.3 05/09/2024     Lab Results   Component Value Date    WBC 6.6 05/09/2024    HGB 13.0 05/09/2024    HCT 41.0 05/09/2024     05/09/2024     No results found for: \"APTT\", \"INR\", \"PTT\"  Lab Results   Component Value Date    TROPONINT <6 05/16/2024     No results found for: \"BNP\", \"PROBNP\"    Lab Results   Component Value Date    CHLPL 238 (H) 05/09/2024    TRIG 195 (H) 05/09/2024    HDL 70 05/09/2024     (H) 05/09/2024     Lab Results   Component Value Date    TSH 3.620 05/09/2024       CV Diagnostics:  Procedures    CXR: Results for orders placed in visit on 05/16/24    XR Chest PA & Lateral    Narrative  XR CHEST PA AND LATERAL    Date of Exam: 5/16/2024 1:56 PM EDT    Indication: Chest Pain    Comparison: None available.    Findings:  Borderline enlarged cardiac silhouette. No focal consolidation. No pleural effusion. No pneumothorax. Osseous structures demonstrate no acute process.    Impression  Impression:  No acute cardiopulmonary findings. Borderline enlarged cardiac silhouette.      Electronically Signed: Dave Potts MD  5/20/2024 10:54 AM EDT  Workstation ID: [id number]     ECHO/MUGA: Results for orders placed in visit on 07/08/24    Adult Transthoracic Echo Complete W/ Cont if Necessary Per Protocol    Interpretation Summary    Left ventricular systolic function is normal. Estimated left ventricular EF = 60% Left ventricular ejection fraction appears to be 56 - 60%.    Left ventricular wall thickness " is consistent with borderline concentric hypertrophy.    Left ventricular diastolic function is consistent with age.    Estimated right ventricular systolic pressure from tricuspid regurgitation is normal (<35 mmHg).     STRESS TESTS: Results for orders placed in visit on 07/08/24    Treadmill Stress Test    Interpretation Summary    Findings consistent with a normal ECG stress test.    No ECG evidence of myocardial ischemia.    Negative clinical evidence of myocardial ischemia.    The Duke Treadmill Score of 0.3 is consistent with a moderate risk for ischemic heart disease.    Mildly impaired exercise capacity.     CARDIAC CATH: No results found for this or any previous visit.     DEVICES: No valid procedures specified.   HOLTER: No results found for this or any previous visit.     CT/MRI:  No results found for this or any previous visit.    VASCULAR: No valid procedures specified.     Assessment and Plan  Diagnoses and all orders for this visit:    1. Chest pain, unspecified type (Primary)  Assessment & Plan:  No recurrence.  Past atypical. Features suggesting musculoskeletal. EKG changes during primary care visit, resolved during cardio visit.  Stress test and echo negative.  Clinical follow-up.      2. Mixed hyperlipidemia  Assessment & Plan:   Lipid abnormalities are improving on medical therapy.     Plan:  Change rosuvastatin back to 5 mg p.o. daily due to possible neuropathic pain related to statin use.       Discussed medication dosage, use, side effects, and goals of treatment in detail.    Counseled patient on lifestyle modifications to help control hyperlipidemia.   Cholesterol lowering dietary information shared with patient.  Advised patient to exercise for 150 minutes weekly. (30 minute brisk walk, 5 days a week for example)  Weight Loss encouraged  Patient counseled in regards to heart healthy, low fat/ low cholesterol/low sat diet, daily exercise for 30 minutes, low to moderate intensity, and weight  loss.  Lipid profile as per PCP office to target less than 70 mg/dL.    Patient Treatment Goals:   LDL goal is less than 70     Followup in 6 months.    Orders:  -     Discontinue: rosuvastatin (CRESTOR) 5 MG tablet; Take 1 tablet by mouth Daily.  Dispense: 90 tablet; Refill: 1  -     rosuvastatin (CRESTOR) 5 MG tablet; Take 1 tablet by mouth Daily.  Dispense: 90 tablet; Refill: 1    3. Shortness of breath  Assessment & Plan:  Currently asymptomatic.  Past shortness of breath associated with some chest pain, musculoskeletal in nature.  Patient changed her diet and increase her daily exercise.  EKG normal.  Transthoracic echocardiogram age-appropriate.  Stress test negative.  Risk factors: Hyperlipidemia. Plan:  Patient counseled in regards to heart healthy, low fat/ low cholesterol/low sat diet, daily exercise for 30 minutes, low to moderate intensity, and weight loss.  Continue rosuvastatin 5 mg p.o. daily for hyperlipidemia and primary prevention of cardiovascular diseases  Clinical follow-up      4. Burning sensation of lower extremity  Assessment & Plan:  Possible neuropathic pain.  Pulses present bilaterally 2+.  No significant edema.  Differential includes medication side effects, neuropathy, dermatological problems, electrolytes, other.  Plan:  Encourage daily multivitamins as well as follow-up with primary care provider in regards to blood work regarding vitamin deficiency/electrolytes  Decrease rosuvastatin to 5 mg p.o. daily as could be related to statin therapy  Follow-up with PCP; and by phone call clinically in 6-8 weeks           Return in about 6 months (around 8/10/2025) for Follow-up with Dr Davila.    There are no Patient Instructions on file for this visit.    Theo Davila MD

## 2025-02-10 NOTE — ASSESSMENT & PLAN NOTE
Possible neuropathic pain.  Pulses present bilaterally 2+.  No significant edema.  Differential includes medication side effects, neuropathy, dermatological problems, electrolytes, other.  Plan:  Encourage daily multivitamins as well as follow-up with primary care provider in regards to blood work regarding vitamin deficiency/electrolytes  Decrease rosuvastatin to 5 mg p.o. daily as could be related to statin therapy  Follow-up with PCP; and by phone call clinically in 6-8 weeks

## 2025-02-10 NOTE — ASSESSMENT & PLAN NOTE
Currently asymptomatic.  Past shortness of breath associated with some chest pain, musculoskeletal in nature.  Patient changed her diet and increase her daily exercise.  EKG normal.  Transthoracic echocardiogram age-appropriate.  Stress test negative.  Risk factors: Hyperlipidemia. Plan:  Patient counseled in regards to heart healthy, low fat/ low cholesterol/low sat diet, daily exercise for 30 minutes, low to moderate intensity, and weight loss.  Continue rosuvastatin 5 mg p.o. daily for hyperlipidemia and primary prevention of cardiovascular diseases  Clinical follow-up

## 2025-02-10 NOTE — ASSESSMENT & PLAN NOTE
No recurrence.  Past atypical. Features suggesting musculoskeletal. EKG changes during primary care visit, resolved during cardio visit.  Stress test and echo negative.  Clinical follow-up.

## 2025-08-11 ENCOUNTER — OFFICE VISIT (OUTPATIENT)
Dept: CARDIOLOGY | Facility: CLINIC | Age: 55
End: 2025-08-11
Payer: COMMERCIAL

## 2025-08-11 VITALS
DIASTOLIC BLOOD PRESSURE: 78 MMHG | WEIGHT: 196.8 LBS | TEMPERATURE: 97.5 F | OXYGEN SATURATION: 99 % | HEIGHT: 63 IN | SYSTOLIC BLOOD PRESSURE: 134 MMHG | HEART RATE: 66 BPM | BODY MASS INDEX: 34.87 KG/M2

## 2025-08-11 DIAGNOSIS — G57.93 NEUROPATHIC PAIN OF BOTH FEET: ICD-10-CM

## 2025-08-11 DIAGNOSIS — R07.9 CHEST PAIN, UNSPECIFIED TYPE: Primary | ICD-10-CM

## 2025-08-11 DIAGNOSIS — E78.2 MIXED HYPERLIPIDEMIA: ICD-10-CM

## 2025-08-11 PROCEDURE — 99214 OFFICE O/P EST MOD 30 MIN: CPT | Performed by: INTERNAL MEDICINE

## 2025-08-12 LAB
ALBUMIN SERPL-MCNC: 4.5 G/DL (ref 3.8–4.9)
ALP SERPL-CCNC: 67 IU/L (ref 44–121)
ALT SERPL-CCNC: 17 IU/L (ref 0–32)
AST SERPL-CCNC: 20 IU/L (ref 0–40)
BASOPHILS # BLD AUTO: 0 X10E3/UL (ref 0–0.2)
BASOPHILS NFR BLD AUTO: 1 %
BILIRUB SERPL-MCNC: 0.4 MG/DL (ref 0–1.2)
BUN SERPL-MCNC: 9 MG/DL (ref 6–24)
BUN/CREAT SERPL: 12 (ref 9–23)
CALCIUM SERPL-MCNC: 9.4 MG/DL (ref 8.7–10.2)
CHLORIDE SERPL-SCNC: 105 MMOL/L (ref 96–106)
CHOLEST SERPL-MCNC: 188 MG/DL (ref 100–199)
CO2 SERPL-SCNC: 21 MMOL/L (ref 20–29)
CREAT SERPL-MCNC: 0.73 MG/DL (ref 0.57–1)
EGFRCR SERPLBLD CKD-EPI 2021: 96 ML/MIN/1.73
EOSINOPHIL # BLD AUTO: 0.1 X10E3/UL (ref 0–0.4)
EOSINOPHIL NFR BLD AUTO: 1 %
ERYTHROCYTE [DISTWIDTH] IN BLOOD BY AUTOMATED COUNT: 14.2 % (ref 11.7–15.4)
FOLATE SERPL-MCNC: 16.3 NG/ML
GLOBULIN SER CALC-MCNC: 2.9 G/DL (ref 1.5–4.5)
GLUCOSE SERPL-MCNC: 96 MG/DL (ref 70–99)
HBA1C MFR BLD: 6.1 % (ref 4.8–5.6)
HCT VFR BLD AUTO: 41.6 % (ref 34–46.6)
HDLC SERPL-MCNC: 82 MG/DL
HGB BLD-MCNC: 13.3 G/DL (ref 11.1–15.9)
IMM GRANULOCYTES # BLD AUTO: 0 X10E3/UL (ref 0–0.1)
IMM GRANULOCYTES NFR BLD AUTO: 0 %
LDLC SERPL CALC-MCNC: 82 MG/DL (ref 0–99)
LYMPHOCYTES # BLD AUTO: 2.1 X10E3/UL (ref 0.7–3.1)
LYMPHOCYTES NFR BLD AUTO: 37 %
MAGNESIUM SERPL-MCNC: 2.3 MG/DL (ref 1.6–2.3)
MCH RBC QN AUTO: 27.2 PG (ref 26.6–33)
MCHC RBC AUTO-ENTMCNC: 32 G/DL (ref 31.5–35.7)
MCV RBC AUTO: 85 FL (ref 79–97)
MONOCYTES # BLD AUTO: 0.4 X10E3/UL (ref 0.1–0.9)
MONOCYTES NFR BLD AUTO: 7 %
NEUTROPHILS # BLD AUTO: 3.2 X10E3/UL (ref 1.4–7)
NEUTROPHILS NFR BLD AUTO: 54 %
PLATELET # BLD AUTO: 235 X10E3/UL (ref 150–450)
POTASSIUM SERPL-SCNC: 3.7 MMOL/L (ref 3.5–5.2)
PROT SERPL-MCNC: 7.4 G/DL (ref 6–8.5)
RBC # BLD AUTO: 4.89 X10E6/UL (ref 3.77–5.28)
SODIUM SERPL-SCNC: 141 MMOL/L (ref 134–144)
T4 FREE SERPL-MCNC: 1.39 NG/DL (ref 0.82–1.77)
TRIGL SERPL-MCNC: 141 MG/DL (ref 0–149)
TSH SERPL DL<=0.005 MIU/L-ACNC: 3.23 UIU/ML (ref 0.45–4.5)
VIT B12 SERPL-MCNC: 382 PG/ML (ref 232–1245)
VLDLC SERPL CALC-MCNC: 24 MG/DL (ref 5–40)
WBC # BLD AUTO: 5.8 X10E3/UL (ref 3.4–10.8)